# Patient Record
Sex: MALE | Race: WHITE | NOT HISPANIC OR LATINO | Employment: OTHER | URBAN - METROPOLITAN AREA
[De-identification: names, ages, dates, MRNs, and addresses within clinical notes are randomized per-mention and may not be internally consistent; named-entity substitution may affect disease eponyms.]

---

## 2017-01-20 ENCOUNTER — OFFICE VISIT (OUTPATIENT)
Dept: INTERNAL MEDICINE | Facility: CLINIC | Age: 82
End: 2017-01-20

## 2017-01-20 VITALS
HEART RATE: 76 BPM | BODY MASS INDEX: 31.95 KG/M2 | RESPIRATION RATE: 16 BRPM | TEMPERATURE: 97.2 F | WEIGHT: 204 LBS | DIASTOLIC BLOOD PRESSURE: 72 MMHG | SYSTOLIC BLOOD PRESSURE: 124 MMHG

## 2017-01-20 DIAGNOSIS — N40.0 BENIGN NON-NODULAR PROSTATIC HYPERPLASIA, PRESENCE OF LOWER URINARY TRACT SYMPTOMS UNSPECIFIED: ICD-10-CM

## 2017-01-20 DIAGNOSIS — M48.061 LUMBAR SPINAL STENOSIS: Primary | ICD-10-CM

## 2017-01-20 DIAGNOSIS — I10 ESSENTIAL HYPERTENSION: ICD-10-CM

## 2017-01-20 DIAGNOSIS — G20 PARKINSON DISEASE (HCC): ICD-10-CM

## 2017-01-20 DIAGNOSIS — N50.89 SCROTAL SWELLING: ICD-10-CM

## 2017-01-20 PROCEDURE — 99214 OFFICE O/P EST MOD 30 MIN: CPT | Performed by: FAMILY MEDICINE

## 2017-01-20 RX ORDER — HYDROCODONE BITARTRATE AND ACETAMINOPHEN 7.5; 325 MG/1; MG/1
1 TABLET ORAL EVERY 6 HOURS PRN
Qty: 60 TABLET | Refills: 0 | Status: SHIPPED | OUTPATIENT
Start: 2017-01-20 | End: 2017-04-20 | Stop reason: SDUPTHER

## 2017-01-20 NOTE — MR AVS SNAPSHOT
Sp Mlaagon   1/20/2017 2:30 PM   Office Visit    Dept Phone:  [phone]   Encounter #:  [id number]    Provider:  Hieu Blanco Jr., MD   Department:  [facility] INTERNAL MEDICINE                Your Full Care Plan              Your Updated Medication List          This list is accurate as of: 1/20/17  3:18 PM.  Always use your most recent med list.                amLODIPine 10 MG tablet   Commonly known as:  NORVASC       aspirin 81 MG EC tablet       atenolol 25 MG tablet   Commonly known as:  TENORMIN       carbidopa-levodopa ER  MG per tablet   Commonly known as:  SINEMET CR       cholecalciferol 1000 UNITS tablet   Commonly known as:  VITAMIN D3       diphenhydrAMINE 12.5 MG/5ML elixir   Commonly known as:  BENADRYL       donepezil 10 MG tablet   Commonly known as:  ARICEPT       ferrous sulfate 324 (65 FE) MG tablet delayed-release EC tablet       hydrochlorothiazide 25 MG tablet   Commonly known as:  HYDRODIURIL   Take 1 tablet by mouth Daily.       HYDROcodone-acetaminophen 7.5-325 MG per tablet   Commonly known as:  NORCO   Take 1 tablet by mouth Every 6 (Six) Hours As Needed for moderate pain (4-6).       lactulose 20 GM/30ML solution solution       LAMISIL 250 MG tablet   Generic drug:  terbinafine       LORazepam 0.5 MG tablet   Commonly known as:  ATIVAN       omeprazole 20 MG capsule   Commonly known as:  priLOSEC       oxybutynin 5 MG tablet   Commonly known as:  DITROPAN       QUEtiapine 25 MG tablet   Commonly known as:  SEROQUEL   Take 1 tablet by mouth Every Night.       rOPINIRole 3 MG tablet   Commonly known as:  REQUIP       sennosides-docusate sodium 8.6-50 MG tablet   Commonly known as:  SENOKOT-S   Take 2 tablets by mouth Every Night.       tamsulosin 0.4 MG capsule 24 hr capsule   Commonly known as:  FLOMAX               We Performed the Following     Ambulatory Referral to Urology       You Were Diagnosed With        Codes Comments    Lumbar spinal stenosis    -  Primary ICD-10-CM: M48.06  ICD-9-CM: 724.02     Parkinson disease     ICD-10-CM: G20  ICD-9-CM: 332.0     Essential hypertension     ICD-10-CM: I10  ICD-9-CM: 401.9     Benign non-nodular prostatic hyperplasia, presence of lower urinary tract symptoms unspecified     ICD-10-CM: N40.0  ICD-9-CM: 600.90     Scrotal swelling     ICD-10-CM: N50.89  ICD-9-CM: 608.86       Instructions     None    Patient Instructions History      Upcoming Appointments     Visit Type Date Time Department    OFFICE VISIT 1/20/2017  2:30 PM LÁZARO SINGH 1 3622      [employer] Signup     Our records indicate that you have declined [employer] signup. If you would like to sign up for [email or web address], please email [email or web address] or call [phone] to obtain an activation code.             Other Info from Your Visit           Allergies     No Known Allergies      Reason for Visit     Back Pain     Groin Swelling     Gait Problem           Vital Signs     Blood Pressure Pulse Temperature Respirations Weight Body Mass Index    124/72 (BP Location: Left arm, Patient Position: Sitting, Cuff Size: Adult) 76 97.2 °F (36.2 °C) (Tympanic) 16 204 lb (92.5 kg) 31.95 kg/m2    Smoking Status                   Never Smoker           Problems and Diagnoses Noted     Benign enlargement of prostate    High blood pressure    Lumbar spinal stenosis    Parkinson disease    Swelling of scrotum

## 2017-01-20 NOTE — PROGRESS NOTES
Subjective   Sp Malagon is a 81 y.o. male.     Chief Complaint   Patient presents with   • Back Pain   • Groin Swelling   • Gait Problem         History of Present Illness the patient returns with his wife is had successful bar surgery at [facility].  This is relieved much of his pain.  He saw some pain in the buttocks.  These can ago see his hip surgeon regarding possible left hip surgery.    He reports a problem with big testicles.  This is being on for several months.  He has some skin irritation from this.  Otherwise he's been in the [facility] and they given him fluorouracil use on some skin cancer changes in his legs and his cause redness.    We discussed refilling pain medicine.  Takes occasional pain pill.    The following portions of the patient's history were reviewed and updated as appropriate: allergies, current medications, past social history and problem list.    Review of Systems   Constitutional: Negative.    HENT: Negative.    Eyes: Negative.    Respiratory: Positive for shortness of breath.    Cardiovascular: Negative.    Gastrointestinal: Negative.    Endocrine: Negative.    Genitourinary: Positive for scrotal swelling.   Musculoskeletal: Positive for back pain.   Skin: Positive for rash.   Allergic/Immunologic: Negative.    Neurological: Negative.    Hematological: Negative.    Psychiatric/Behavioral: Negative.        Objective   Vitals:    01/20/17 1443   BP: 124/72   Pulse: 76   Resp: 16   Temp: 97.2 °F (36.2 °C)     Physical Exam   Constitutional: He is oriented to person, place, and time. He appears well-developed and well-nourished.   HENT:   Head: Normocephalic and atraumatic.   Right Ear: Tympanic membrane and external ear normal.   Left Ear: Tympanic membrane and external ear normal.   Nose: Nose normal.   Mouth/Throat: Oropharynx is clear and moist.   Eyes: Conjunctivae and EOM are normal. Pupils are equal, round, and reactive to light.   Neck: Normal range of motion. Neck supple. No  JVD present. No thyromegaly present.   Cardiovascular: Normal rate, regular rhythm, normal heart sounds and intact distal pulses.    Pulmonary/Chest: Effort normal and breath sounds normal.   Abdominal: Soft. Bowel sounds are normal.   Genitourinary:         Musculoskeletal: Normal range of motion.   Lymphadenopathy:     He has no cervical adenopathy.   Neurological: He is alert and oriented to person, place, and time. No cranial nerve deficit. He exhibits abnormal muscle tone. Coordination and gait abnormal.   Skin: Skin is warm and dry. No rash noted.   Psychiatric: He has a normal mood and affect. His behavior is normal. Judgment and thought content normal.   Vitals reviewed.      Assessment/Plan   Problem List Items Addressed This Visit        Cardiovascular and Mediastinum    Hypertension       Nervous and Auditory    Parkinson disease       Genitourinary    BPH (benign prostatic hyperplasia)       Other    Lumbar spinal stenosis - Primary      Other Visit Diagnoses     Scrotal swelling        Relevant Orders    Ambulatory Referral to Urology       plan: Refill hydrocodone No. 60.  Referral to urology concerning swelling of the right testicle and scrotum with probable hydrocele.    Recheck in about 3 months.  Follow-up with neurosurgery and orthopedic surgery concerning the left hip.  Meds for hypertension are continued.

## 2017-02-13 ENCOUNTER — HOSPITAL ENCOUNTER (EMERGENCY)
Facility: HOSPITAL | Age: 82
Discharge: HOME | End: 2017-02-13
Attending: EMERGENCY MEDICINE | Admitting: EMERGENCY MEDICINE

## 2017-02-13 ENCOUNTER — APPOINTMENT (OUTPATIENT)
Dept: GENERAL RADIOLOGY | Facility: HOSPITAL | Age: 82
End: 2017-02-13

## 2017-02-13 ENCOUNTER — APPOINTMENT (OUTPATIENT)
Dept: CT IMAGING | Facility: HOSPITAL | Age: 82
End: 2017-02-13

## 2017-02-13 VITALS
RESPIRATION RATE: 18 BRPM | TEMPERATURE: 98.7 F | OXYGEN SATURATION: 95 % | HEIGHT: 68 IN | HEART RATE: 67 BPM | BODY MASS INDEX: 30.31 KG/M2 | WEIGHT: 200 LBS | SYSTOLIC BLOOD PRESSURE: 175 MMHG | DIASTOLIC BLOOD PRESSURE: 95 MMHG

## 2017-02-13 DIAGNOSIS — R53.1 WEAKNESS: ICD-10-CM
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
## 2017-02-13 LAB
ALBUMIN SERPL-MCNC: 4.2 G/DL (ref 3.5–5.2)
ALBUMIN/GLOB SERPL: 1.4 G/DL
ALP SERPL-CCNC: 115 U/L (ref 39–117)
ALT SERPL W P-5'-P-CCNC: 14 U/L (ref 1–41)
ANION GAP SERPL CALCULATED.3IONS-SCNC: 12.9 MMOL/L
AST SERPL-CCNC: 11 U/L (ref 1–40)
BACTERIA UR QL AUTO: ABNORMAL /HPF
BASOPHILS # BLD AUTO: 0.01 10*3/MM3 (ref 0–0.2)
BASOPHILS NFR BLD AUTO: 0.1 % (ref 0–1.5)
BILIRUB SERPL-MCNC: 0.6 MG/DL (ref 0.1–1.2)
BILIRUB UR QL STRIP: NEGATIVE
BUN BLD-MCNC: 22 MG/DL (ref 8–23)
BUN/CREAT SERPL: 22 (ref 7–25)
CALCIUM SPEC-SCNC: 9 MG/DL (ref 8.6–10.5)
CHLORIDE SERPL-SCNC: 101 MMOL/L (ref 98–107)
CLARITY UR: CLEAR
CO2 SERPL-SCNC: 25.1 MMOL/L (ref 22–29)
COLOR UR: YELLOW
CREAT BLD-MCNC: 1 MG/DL (ref 0.76–1.27)
DEPRECATED RDW RBC AUTO: 44.4 FL (ref 37–54)
EOSINOPHIL # BLD AUTO: 0 10*3/MM3 (ref 0–0.7)
EOSINOPHIL NFR BLD AUTO: 0 % (ref 0.3–6.2)
ERYTHROCYTE [DISTWIDTH] IN BLOOD BY AUTOMATED COUNT: 15.1 % (ref 11.5–14.5)
GFR SERPL CREATININE-BSD FRML MDRD: 72 ML/MIN/1.73
GLOBULIN UR ELPH-MCNC: 3.1 GM/DL
GLUCOSE BLD-MCNC: 104 MG/DL (ref 65–99)
GLUCOSE UR STRIP-MCNC: NEGATIVE MG/DL
HCT VFR BLD AUTO: 40.9 % (ref 40.4–52.2)
HGB BLD-MCNC: 13.4 G/DL (ref 13.7–17.6)
HGB UR QL STRIP.AUTO: ABNORMAL
HOLD SPECIMEN: NORMAL
HOLD SPECIMEN: NORMAL
HYALINE CASTS UR QL AUTO: ABNORMAL /LPF
IMM GRANULOCYTES # BLD: 0.03 10*3/MM3 (ref 0–0.03)
IMM GRANULOCYTES NFR BLD: 0.3 % (ref 0–0.5)
KETONES UR QL STRIP: NEGATIVE
LEUKOCYTE ESTERASE UR QL STRIP.AUTO: NEGATIVE
LYMPHOCYTES # BLD AUTO: 0.71 10*3/MM3 (ref 0.9–4.8)
LYMPHOCYTES NFR BLD AUTO: 7.5 % (ref 19.6–45.3)
MAGNESIUM SERPL-MCNC: 1.9 MG/DL (ref 1.6–2.4)
MCH RBC QN AUTO: 26.3 PG (ref 27–32.7)
MCHC RBC AUTO-ENTMCNC: 32.8 G/DL (ref 32.6–36.4)
MCV RBC AUTO: 80.4 FL (ref 79.8–96.2)
MONOCYTES # BLD AUTO: 0.53 10*3/MM3 (ref 0.2–1.2)
MONOCYTES NFR BLD AUTO: 5.6 % (ref 5–12)
NEUTROPHILS # BLD AUTO: 8.22 10*3/MM3 (ref 1.9–8.1)
NEUTROPHILS NFR BLD AUTO: 86.5 % (ref 42.7–76)
NITRITE UR QL STRIP: NEGATIVE
PH UR STRIP.AUTO: 6 [PH] (ref 5–8)
PLATELET # BLD AUTO: 192 10*3/MM3 (ref 140–500)
PMV BLD AUTO: 9.3 FL (ref 6–12)
POTASSIUM BLD-SCNC: 3.9 MMOL/L (ref 3.5–5.2)
PROT SERPL-MCNC: 7.3 G/DL (ref 6–8.5)
PROT UR QL STRIP: NEGATIVE
RBC # BLD AUTO: 5.09 10*6/MM3 (ref 4.6–6)
RBC # UR: ABNORMAL /HPF
REF LAB TEST METHOD: ABNORMAL
SODIUM BLD-SCNC: 139 MMOL/L (ref 136–145)
SP GR UR STRIP: 1.01 (ref 1–1.03)
SQUAMOUS #/AREA URNS HPF: ABNORMAL /HPF
TROPONIN T SERPL-MCNC: <0.01 NG/ML (ref 0–0.03)
UROBILINOGEN UR QL STRIP: ABNORMAL
WBC NRBC COR # BLD: 9.5 10*3/MM3 (ref 4.5–10.7)
WBC UR QL AUTO: ABNORMAL /HPF
WHOLE BLOOD HOLD SPECIMEN: NORMAL
WHOLE BLOOD HOLD SPECIMEN: NORMAL

## 2017-02-13 PROCEDURE — 83735 ASSAY OF MAGNESIUM: CPT

## 2017-02-13 PROCEDURE — 93010 ELECTROCARDIOGRAM REPORT: CPT | Performed by: INTERNAL MEDICINE

## 2017-02-13 PROCEDURE — 99284 EMERGENCY DEPT VISIT MOD MDM: CPT

## 2017-02-13 PROCEDURE — 71010 HC CHEST PA OR AP: CPT

## 2017-02-13 PROCEDURE — 93005 ELECTROCARDIOGRAM TRACING: CPT

## 2017-02-13 PROCEDURE — 80053 COMPREHEN METABOLIC PANEL: CPT

## 2017-02-13 PROCEDURE — 85025 COMPLETE CBC W/AUTO DIFF WBC: CPT

## 2017-02-13 PROCEDURE — 84484 ASSAY OF TROPONIN QUANT: CPT

## 2017-02-13 PROCEDURE — 81001 URINALYSIS AUTO W/SCOPE: CPT

## 2017-02-13 PROCEDURE — 70450 CT HEAD/BRAIN W/O DYE: CPT

## 2017-02-13 RX ORDER — SODIUM CHLORIDE 0.9 % (FLUSH) 0.9 %
10 SYRINGE (ML) INJECTION AS NEEDED
Status: DISCONTINUED | OUTPATIENT
Start: 2017-02-13 | End: 2017-02-13 | Stop reason: HOSPADM

## 2017-02-13 NOTE — ED NOTES
Pt has hx of parkinsons and has had more difficulty walking lately. Today, pt was walking around and lost balance. Pt denies hitting head and loc. Pt has skin tear on left forearm.     Zara Duarte RN  02/13/17 0948

## 2017-02-13 NOTE — PROGRESS NOTES
"Discharge Planning Assessment  [facility]     Patient Name: Sp Malagon  MRN: [id number]  Today's Date: 2/13/2017    Admit Date: 2/13/2017          Discharge Needs Assessment       02/13/17 8532    Discharge Needs Assessment    Discharge Planning Comments --   Pt is medically clear for dc, per wife she is going to look into acute rehab. Has [facility] and in home personal care agency list for needs.      02/13/17 1047    Living Environment    Quality Of Family Relationships supportive   type of placement she needs at this time. [facility] provided as well as in home personal care agency list. A wait work up by PA for dispo.    Able to Return to Prior Living Arrangements --   Wife states that his mobility has decreased in the last 2 days and she can no longer \"pull and tug on him due to his size.\" Pt and wife state he has not had an inpt qualifying stay for  to pay for short term. Wife states she is unsure exactally what     Living Arrangement Comments --   Asked to talk with pt and wife Юлия about more assistance at home. Wife states that pt had back sx in October went to rehab for 2 1/2 weeks and then had [facility]. [facility] now but she states she needs more..            Discharge Plan     None        Discharge Placement     No information found                Demographic Summary     None            Functional Status     None            Psychosocial     None            Abuse/Neglect     None            Legal     None            Substance Abuse     None            Patient Forms     None          Sandy Nieto RN    "

## 2017-02-13 NOTE — PROGRESS NOTES
"Discharge Planning Assessment  [facility]     Patient Name: Sp Malagon  MRN: [id number]  Today's Date: 2/13/2017    Admit Date: 2/13/2017          Discharge Needs Assessment       02/13/17 1047    Living Environment    Quality Of Family Relationships supportive   type of placement she needs at this time. [facility] provided as well as in home personal care agency list. A wait work up by PA for dispo.    Able to Return to Prior Living Arrangements --   Wife states that his mobility has decreased in the last 2 days and she can no longer \"pull and tug on him due to his size.\" Pt and wife state he has not had an inpt qualifying stay for  to pay for short term. Wife states she is unsure exactally what     Living Arrangement Comments --   Asked to talk with pt and wife Юлия about more assistance at home. Wife states that pt had back sx in October went to rehab for 2 1/2 weeks and then had [facility]. [facility] now but she states she needs more..            Discharge Plan     None        Discharge Placement     No information found                Demographic Summary     None            Functional Status     None            Psychosocial     None            Abuse/Neglect     None            Legal     None            Substance Abuse     None            Patient Forms     None          Sandy Nieto RN    "

## 2017-02-13 NOTE — ED PROVIDER NOTES
I supervised care provided by the midlevel provider.    We have discussed this patient's history, physical exam, and treatment plan.   I have reviewed the note and personally saw and examined the patient and agree with the plan of care.      Pt with fall today after attempting to transfer to his wheelchair. Pt did not strike his head or have LOC. Pt has a hx of Parkinson's. Pt has had increasing difficulty with ambulation due to his Parkinson's.    On exam, he is A&Ox3 and in NAD. GCS 15. VS stable    Will discharge and [facility] his Parkinson's medications.    Documentation assistance provided by nita Jade for Dr. Barroso.  Information recorded by the scribe was done at my direction and has been verified and validated by me.       Gm Jade  02/13/17 1301       Rei Barroso MD  02/13/17 5636

## 2017-02-13 NOTE — DISCHARGE INSTRUCTIONS
Use walker at all times    Return for any fever or worsening symptoms      Call [facility] at [facility] for follow-up

## 2017-02-13 NOTE — ED PROVIDER NOTES
EMERGENCY DEPARTMENT ENCOUNTER    CHIEF COMPLAINT  Chief Complaint: Fall  History given by: Patient  History limited by: Nothing  Room Number: 04/04  PMD: Hieu Blanco Jr., MD      HPI:  Pt is a 81 y.o. male, h/o Parkinson's Disease, presents via EMS s/p mechanical fall attempting to stand from his chair this morning without LOC or head / neck trauma. The patient's spouse explains that she left the room right before the patient fell and she called EMS because she was unable to get the patient off the floor. The patient has experienced progressively worsening issues ambulating due to the Parkinson's Disease and he simply lost his balance due to the gait instability. He also sustained a superficial skin tear to his left wrist upon impact. Pt had a few episodes of diarrhea yesterday but denies nausea, vomiting, fever, chest pain or SOA. Patient was taking Aricept for dementia and confusion although was unable to tolerate the medication. No other complaints at this time.      Duration:  1-2 seconds  Onset: Sudden  Timing: Constant  Location: Left wrist  Radiation: None  Quality: Dull  Intensity/Severity: Moderate  Progression: No Change  Associated Symptoms: Skin tear, gait issue  Aggravating Factors: Palption  Alleviating Factors: None  Previous Episodes: Yes, previous falls  Treatment before arrival: None    PAST MEDICAL HISTORY  Active Ambulatory Problems     Diagnosis Date Noted   • Degeneration of lumbar intervertebral disc 05/20/2016   • Parkinson disease 05/20/2016   • Hypertension 05/20/2016   • Chronic constipation 05/20/2016   • Lumbar spinal stenosis 10/03/2016   • BPH (benign prostatic hyperplasia) 10/04/2016   • Postoperative delirium 10/04/2016     Resolved Ambulatory Problems     Diagnosis Date Noted   • No Resolved Ambulatory Problems     Past Medical History   Diagnosis Date   • Abnormal EKG    • Allergic rhinitis    • Arthritis    • GERD (gastroesophageal reflux disease)    • Numbness and tingling     • Parkinson's disease    • Sacral back pain        PAST SURGICAL HISTORY  Past Surgical History   Procedure Laterality Date   • Laminectomy  2004 2005   • Lumbar fusion  2009   • Back surgery     • Cervical spine anterior     • Eye surgery Left      cataract   • Cataract extraction Bilateral 2016   • Hip arthroplasty Right    • Lumbar discectomy fusion instrumentation N/A 10/3/2016     Procedure: L3-L5 HARDWARE REMOVAL. L5-S1 EXPIRATION OF FUSION;  Surgeon: Tristen Baez MD;  Location: [facility] OR;  Service:    • Lumbar laminectomy discectomy decompression N/A 10/3/2016     Procedure: L5-S1 LUMBAR LAMINECTOMY DISCECTOMY DECOMPRESSION POSTERIOR ;  Surgeon: Rajesh Kenney MD;  Location: [facility] OR;  Service:        FAMILY HISTORY  Family History   Problem Relation Age of Onset   • Emphysema Mother    • Cancer Father    • Heart disease Sister    • Diabetes Sister    • Hypertension Sister        SOCIAL HISTORY  Social History     Social History   • Marital status:      Spouse name: N/A   • Number of children: 1   • Years of education: 1 Year of College     Occupational History   • Retired      Social History Main Topics   • Smoking status: Never Smoker   • Smokeless tobacco: Never Used   • Alcohol use No   • Drug use: No   • Sexual activity: Defer     Other Topics Concern   • Not on file     Social History Narrative   • No narrative on file       ALLERGIES  Review of patient's allergies indicates no known allergies.    REVIEW OF SYSTEMS  Review of Systems   Constitutional: Negative for chills and fatigue.   HENT: Negative for congestion, rhinorrhea and sore throat.    Eyes: Negative for pain.   Respiratory: Negative for cough, shortness of breath and wheezing.    Cardiovascular: Negative for chest pain, palpitations and leg swelling.   Gastrointestinal: Negative for abdominal pain, diarrhea, nausea and vomiting.   Genitourinary: Negative for difficulty urinating, dysuria, flank pain and  frequency.   Musculoskeletal: Positive for gait problem. Negative for arthralgias, myalgias, neck pain and neck stiffness.   Skin: Negative for rash.        Skin tear left wrist    Neurological: Negative for dizziness, speech difficulty, weakness, light-headedness, numbness and headaches.   Psychiatric/Behavioral: Negative.    All other systems reviewed and are negative.      PHYSICAL EXAM  ED Triage Vitals   Temp Heart Rate Resp BP SpO2   02/13/17 0911 02/13/17 0911 02/13/17 0911 02/13/17 0911 02/13/17 0911   98.7 °F (37.1 °C) 76 16 159/90 95 %      Temp src Heart Rate Source Patient Position BP Location FiO2 (%)   -- -- -- -- --              Physical Exam   Constitutional: He is oriented to person, place, and time and well-developed, well-nourished, and in no distress.   HENT:   Head: Normocephalic and atraumatic.   Eyes: EOM are normal. Pupils are equal, round, and reactive to light.   Neck: Normal range of motion. Neck supple.   Cardiovascular: Normal rate, regular rhythm and normal heart sounds.    Pulmonary/Chest: Effort normal and breath sounds normal. No respiratory distress.   Abdominal: Soft. There is no tenderness. There is no rebound and no guarding.   Musculoskeletal: Normal range of motion. He exhibits no edema.   FROM left wrist    Neurological: He is alert and oriented to person, place, and time. He has normal sensation and normal strength.   Skin: Skin is warm and dry.   Skin tear left wrist.    Psychiatric: Mood and affect normal.   Nursing note and vitals reviewed.      LAB RESULTS  Lab Results (last 24 hours)     Procedure Component Value Units Date/Time    CBC & Differential [[id number]] Collected:  02/13/17 1005    Specimen:  Blood Updated:  02/13/17 1014    Narrative:       The following orders were created for panel order CBC & Differential.  Procedure                               Abnormality         Status                     ---------                               -----------         ------                      CBC Auto Differential[[id number]]         Abnormal            Final result                 Please view results for these tests on the individual orders.    Comprehensive Metabolic Panel [[id number]]  (Abnormal) Collected:  02/13/17 1005    Specimen:  Blood Updated:  02/13/17 1037     Glucose 104 (H) mg/dL      BUN 22 mg/dL      Creatinine 1.00 mg/dL      Sodium 139 mmol/L      Potassium 3.9 mmol/L      Chloride 101 mmol/L      CO2 25.1 mmol/L      Calcium 9.0 mg/dL      Total Protein 7.3 g/dL      Albumin 4.20 g/dL      ALT (SGPT) 14 U/L      AST (SGOT) 11 U/L      Alkaline Phosphatase 115 U/L      Total Bilirubin 0.6 mg/dL      eGFR Non African Amer 72 mL/min/1.73      Globulin 3.1 gm/dL      A/G Ratio 1.4 g/dL      BUN/Creatinine Ratio 22.0      Anion Gap 12.9 mmol/L     Narrative:       The MDRD GFR formula is only valid for adults with stable renal function between ages 18 and 70.    Troponin [[id number]]  (Normal) Collected:  02/13/17 1005    Specimen:  Blood Updated:  02/13/17 1037     Troponin T <0.010 ng/mL     Narrative:       Troponin T Reference Ranges:  Less than 0.03 ng/mL:    Negative for AMI  0.03 to 0.09 ng/mL:      Indeterminant for AMI  Greater than 0.09 ng/mL: Positive for AMI    Magnesium [[id number]]  (Normal) Collected:  02/13/17 1005    Specimen:  Blood Updated:  02/13/17 1037     Magnesium 1.9 mg/dL     CBC Auto Differential [[id number]]  (Abnormal) Collected:  02/13/17 1005    Specimen:  Blood Updated:  02/13/17 1014     WBC 9.50 10*3/mm3      RBC 5.09 10*6/mm3      Hemoglobin 13.4 (L) g/dL      Hematocrit 40.9 %      MCV 80.4 fL      MCH 26.3 (L) pg      MCHC 32.8 g/dL      RDW 15.1 (H) %      RDW-SD 44.4 fl      MPV 9.3 fL      Platelets 192 10*3/mm3      Neutrophil % 86.5 (H) %      Lymphocyte % 7.5 (L) %      Monocyte % 5.6 %      Eosinophil % 0.0 (L) %      Basophil % 0.1 %      Immature Grans % 0.3 %      Neutrophils, Absolute 8.22 (H) 10*3/mm3      Lymphocytes, Absolute 0.71  (L) 10*3/mm3      Monocytes, Absolute 0.53 10*3/mm3      Eosinophils, Absolute 0.00 10*3/mm3      Basophils, Absolute 0.01 10*3/mm3      Immature Grans, Absolute 0.03 10*3/mm3     Urinalysis With / Culture If Indicated [[id number]]  (Abnormal) Collected:  02/13/17 1129    Specimen:  Urine from Urine, Clean Catch Updated:  02/13/17 1247     Color, UA Yellow      Appearance, UA Clear      pH, UA 6.0      Specific Gravity, UA 1.011      Glucose, UA Negative      Ketones, UA Negative      Bilirubin, UA Negative      Blood, UA Small (1+) (A)      Protein, UA Negative      Leuk Esterase, UA Negative      Nitrite, UA Negative      Urobilinogen, UA 0.2 E.U./dL     Urinalysis, Microscopic Only [[id number]]  (Abnormal) Collected:  02/13/17 1129    Specimen:  Urine from Urine, Clean Catch Updated:  02/13/17 1247     RBC, UA 0-2 (A) /HPF      WBC, UA 0-2 (A) /HPF      Bacteria, UA Trace (A) /HPF      Squamous Epithelial Cells, UA 0-2 /HPF      Hyaline Casts, UA None Seen /LPF      Methodology Automated Microscopy           I ordered the above labs and reviewed the results    RADIOLOGY  CT Head Without Contrast   Final Result   Unremarkable CT scan of the head without contrast.       This report was finalized on 2/13/2017 12:20 PM by Dr. Gregory Brooke MD.          XR Chest 1 View   Final Result         10:35  Reviewed CXR - Nothing acute. Independently viewed by me. Interpreted by radiologist.     I ordered the above noted radiological studies. Interpreted by radiologist.  Reviewed by me in PACS.       PROCEDURES  Procedures      PROGRESS AND CONSULTS  ED Course     09:58  Ordered EKG, labs and CXR for further evaluation.     11:36  Ordered CT head for further evaluation     12:40  Discussed case with  and he agrees with the treatment plan.     12:50  Rechecked patient and they are resting comfortably. Discussed the patient's pertinent labs and imaging results, including negative labs, CXR and CT head. Discussed plan to  discharge the patient home and recommended follow up with his Neurologist. Patient agrees with the plan and all questions were addressed.     Latest vital signs   BP- 138/91 HR- 71 Temp- 98.7 °F (37.1 °C) O2 sat- 95%      MEDICAL DECISION MAKING  Results were reviewed/discussed with the patient and they were also made aware of online access. Pt also made aware that some labs, such as cultures, will not be resulted during ER visit and follow up with PMD is necessary.     MDM  Number of Diagnoses or Management Options  Fall, initial encounter:   Weakness:   Diagnosis management comments: Pt has no focal neuro deficits.  No significant injury from fall.  He has worsening Parkinson's and dementia.  I had the [facility] nurse speak to pt and family, they are not interested in placement at this time.         Amount and/or Complexity of Data Reviewed  Clinical lab tests: ordered and reviewed  Tests in the radiology section of CPT®: ordered and reviewed  Tests in the medicine section of CPT®: ordered and reviewed    Patient Progress  Patient progress: stable         DIAGNOSIS  Final diagnoses:   Fall, initial encounter   Weakness       DISPOSITION  DISCHARGE    Patient discharged in stable condition.    Reviewed implications of results, diagnosis, meds, responsibility to follow up, warning signs and symptoms of possible worsening, potential complications and reasons to return to ER, including any further falls.     Patient/Family voiced understanding of above instructions.    Discussed plan for discharge, as there is no emergent indication for admission.  Pt/family is agreeable and understands need for follow up and repeat testing.  Pt is aware that discharge does not mean that nothing is wrong but it indicates no emergency is present that requires admission and they must continue care with follow-up as given below or physician of their choice.     FOLLOW-UP  Hieu Blanco Jr., MD  [facility] [address]  [address]  [phone]    Schedule an appointment as soon as possible for a visit           Medication List      Stop          cholecalciferol 1000 UNITS tablet   Commonly known as:  VITAMIN D3       donepezil 10 MG tablet   Commonly known as:  ARICEPT       hydrochlorothiazide 25 MG tablet   Commonly known as:  HYDRODIURIL       lactulose 20 GM/30ML solution solution       LAMISIL 250 MG tablet   Generic drug:  terbinafine       QUEtiapine 25 MG tablet   Commonly known as:  SEROQUEL       sennosides-docusate sodium 8.6-50 MG tablet   Commonly known as:  SENOKOT-S           Latest Documented Vital Signs:  As of 12:52 PM  BP- 138/91 HR- 71 Temp- 98.7 °F (37.1 °C) O2 sat- 95%    --  Documentation assistance provided by nita Castillo for Shaggy Pineda PA-C.  Information recorded by the scribe was done at my direction and has been verified and validated by me.          Cresencio Castillo  02/13/17 1308       JÚNIOR Pearson  02/13/17 9800

## 2017-02-15 ENCOUNTER — TELEPHONE (OUTPATIENT)
Dept: SOCIAL WORK | Facility: HOSPITAL | Age: 82
End: 2017-02-15

## 2017-02-15 NOTE — TELEPHONE ENCOUNTER
ED follow-up phone call. Spoke w/ wife, who states that patient is doing better, and able to ambulate w/ walker today. Has upcoming f/u lu't w/ both PCP and neurologist

## 2017-04-20 ENCOUNTER — OFFICE VISIT (OUTPATIENT)
Dept: INTERNAL MEDICINE | Facility: CLINIC | Age: 82
End: 2017-04-20

## 2017-04-20 VITALS
WEIGHT: 202 LBS | DIASTOLIC BLOOD PRESSURE: 73 MMHG | TEMPERATURE: 98.1 F | OXYGEN SATURATION: 94 % | SYSTOLIC BLOOD PRESSURE: 130 MMHG | HEART RATE: 48 BPM | BODY MASS INDEX: 30.62 KG/M2 | HEIGHT: 68 IN

## 2017-04-20 DIAGNOSIS — G20 PARKINSON DISEASE (HCC): ICD-10-CM

## 2017-04-20 DIAGNOSIS — M48.061 LUMBAR SPINAL STENOSIS: Primary | ICD-10-CM

## 2017-04-20 DIAGNOSIS — N40.0 BENIGN NON-NODULAR PROSTATIC HYPERPLASIA, PRESENCE OF LOWER URINARY TRACT SYMPTOMS UNSPECIFIED: ICD-10-CM

## 2017-04-20 DIAGNOSIS — M54.40 CHRONIC LOW BACK PAIN WITH SCIATICA, SCIATICA LATERALITY UNSPECIFIED, UNSPECIFIED BACK PAIN LATERALITY: ICD-10-CM

## 2017-04-20 DIAGNOSIS — I10 ESSENTIAL HYPERTENSION: ICD-10-CM

## 2017-04-20 DIAGNOSIS — G89.29 CHRONIC LOW BACK PAIN WITH SCIATICA, SCIATICA LATERALITY UNSPECIFIED, UNSPECIFIED BACK PAIN LATERALITY: ICD-10-CM

## 2017-04-20 PROCEDURE — 99214 OFFICE O/P EST MOD 30 MIN: CPT | Performed by: FAMILY MEDICINE

## 2017-04-20 RX ORDER — HYDROCODONE BITARTRATE AND ACETAMINOPHEN 7.5; 325 MG/1; MG/1
1 TABLET ORAL EVERY 6 HOURS PRN
Qty: 60 TABLET | Refills: 0 | Status: SHIPPED | OUTPATIENT
Start: 2017-04-20 | End: 2017-05-23

## 2017-04-20 NOTE — PROGRESS NOTES
Subjective   Sp Malagon is a 81 y.o. male.     Chief Complaint   Patient presents with   • Back Pain   • Tremors         History of Present Illness   Patient is here coming by his wife.  His whole situation is becoming a functional problem.  He has had back surgery but has had repetitive falling episodes based on his parkinsonism.  [facility]  [facility].  He is seeing urologist at [facility].  His mobility is limited.  He has some confusion at times.  Is problem at night as his wife asked him up to the bathroom to urinate for 5 time sometimes sometimes only twice.  He is on oxybutynin and Flomax.  He is given samples of myrbetriq better 25 mg to try although I do know if it [facility] and have this medication.    We discussed treatment of acne takes an occasional hydrocodone 7.5.    The following portions of the patient's history were reviewed and updated as appropriate: allergies, current medications, past social history and problem list.    Review of Systems   Constitutional: Positive for fatigue.   HENT: Negative.    Eyes: Negative.    Respiratory: Negative.    Cardiovascular: Negative.    Gastrointestinal: Negative.    Endocrine: Negative.    Genitourinary: Positive for frequency.   Musculoskeletal: Positive for back pain and gait problem.   Skin: Negative.    Allergic/Immunologic: Negative.    Neurological: Positive for tremors and weakness.   Hematological: Negative.    Psychiatric/Behavioral: Negative.        Objective   Vitals:    04/20/17 1208   BP: 130/73   Pulse: (!) 48   Temp: 98.1 °F (36.7 °C)   SpO2: 94%     Physical Exam   Constitutional: He is oriented to person, place, and time. He appears well-developed and well-nourished.   HENT:   Head: Normocephalic and atraumatic.   Right Ear: Tympanic membrane and external ear normal.   Left Ear: Tympanic membrane and external ear normal.    Nose: Nose normal.   Mouth/Throat: Oropharynx is clear and moist.   Eyes: Conjunctivae and EOM are normal. Pupils are equal, round, and reactive to light.   Neck: Normal range of motion. Neck supple. No JVD present. No thyromegaly present.   Cardiovascular: Normal rate, regular rhythm, normal heart sounds and intact distal pulses.    Pulmonary/Chest: Effort normal and breath sounds normal.   Abdominal: Soft. Bowel sounds are normal.   Musculoskeletal:        Lumbar back: He exhibits decreased range of motion and tenderness.   Lymphadenopathy:     He has no cervical adenopathy.   Neurological: He is alert and oriented to person, place, and time. No cranial nerve deficit. He exhibits abnormal muscle tone. Coordination and gait abnormal.   Skin: Skin is warm and dry. No rash noted.        Psychiatric: Judgment and thought content normal. His affect is labile. He is slowed. Cognition and memory are impaired.   Vitals reviewed.      Assessment/Plan   Problem List Items Addressed This Visit        Cardiovascular and Mediastinum    Hypertension       Nervous and Auditory    Parkinson disease       Genitourinary    BPH (benign prostatic hyperplasia)       Other    Lumbar spinal stenosis - Primary      Other Visit Diagnoses     Chronic low back pain with sciatica, sciatica laterality unspecified, unspecified back pain laterality          Plan: This is a difficult situation.  [facility] and [facility]ist at [facility] in July.  We'll see him back in October sooner if needed.  He's given hydrocodone 7.5 4 times a day when necessary.  His wife may need respite care at some point.

## 2017-04-21 ENCOUNTER — TELEPHONE (OUTPATIENT)
Dept: INTERNAL MEDICINE | Facility: CLINIC | Age: 82
End: 2017-04-21

## 2017-04-21 NOTE — TELEPHONE ENCOUNTER
The pt's wife called to let you know he is going to see Dr Soni Calderon down at [facility] 7/13/17    She would like to speak with you, please call her

## 2017-04-21 NOTE — TELEPHONE ENCOUNTER
Patient is having some delusions at home I discussed whether or not patient was on Seroquel generic.  I believe he supposed to be but she does not think he is on it.

## 2017-05-24 RX ORDER — HYDROCODONE BITARTRATE AND ACETAMINOPHEN 5; 325 MG/1; MG/1
1 TABLET ORAL DAILY PRN
Qty: 60 TABLET | Refills: 0 | Status: SHIPPED | OUTPATIENT
Start: 2017-05-24 | End: 2017-10-16 | Stop reason: ALTCHOICE

## 2017-05-25 ENCOUNTER — OUTSIDE FACILITY SERVICE (OUTPATIENT)
Dept: FAMILY MEDICINE CLINIC | Facility: CLINIC | Age: 82
End: 2017-05-25

## 2017-05-25 PROCEDURE — 99305 1ST NF CARE MODERATE MDM 35: CPT | Performed by: FAMILY MEDICINE

## 2017-09-22 ENCOUNTER — TELEPHONE (OUTPATIENT)
Dept: INTERNAL MEDICINE | Facility: CLINIC | Age: 82
End: 2017-09-22

## 2017-09-22 NOTE — TELEPHONE ENCOUNTER
I called patient to let him know that he needed to be seen in the ER. Patient's wife refused to let me speak with the patient and stated that she isn't taking him to the ER. I told her that the symptoms that the patient provided were very serious and were beyond what we can do in the office. Patient's wife stated she isn't stupid and she will know when to take her  to the ER. But she is not taking him right now. I asked if patient breathing was getting better and patient's wife said a little from yesterday. My manager Sharon called that patient after me.

## 2017-09-24 ENCOUNTER — DOCUMENTATION (OUTPATIENT)
Dept: INTERNAL MEDICINE | Facility: CLINIC | Age: 82
End: 2017-09-24

## 2017-09-25 DIAGNOSIS — R00.2 PALPITATIONS: Primary | ICD-10-CM

## 2017-09-25 DIAGNOSIS — R07.9 CHEST PAIN, UNSPECIFIED TYPE: ICD-10-CM

## 2017-10-16 ENCOUNTER — HOSPITAL ENCOUNTER (OUTPATIENT)
Dept: GENERAL RADIOLOGY | Facility: HOSPITAL | Age: 82
Discharge: HOME | End: 2017-10-16
Attending: INTERNAL MEDICINE | Admitting: INTERNAL MEDICINE

## 2017-10-16 ENCOUNTER — OFFICE VISIT (OUTPATIENT)
Dept: CARDIOLOGY | Facility: CLINIC | Age: 82
End: 2017-10-16

## 2017-10-16 VITALS
BODY MASS INDEX: 30.62 KG/M2 | WEIGHT: 202 LBS | HEART RATE: 69 BPM | SYSTOLIC BLOOD PRESSURE: 142 MMHG | HEIGHT: 68 IN | DIASTOLIC BLOOD PRESSURE: 90 MMHG

## 2017-10-16 DIAGNOSIS — R06.09 DOE (DYSPNEA ON EXERTION): Primary | ICD-10-CM

## 2017-10-16 DIAGNOSIS — I10 ESSENTIAL HYPERTENSION: ICD-10-CM

## 2017-10-16 DIAGNOSIS — R06.09 DOE (DYSPNEA ON EXERTION): ICD-10-CM

## 2017-10-16 PROCEDURE — 71020 HC CHEST PA AND LATERAL: CPT

## 2017-10-16 PROCEDURE — 99204 OFFICE O/P NEW MOD 45 MIN: CPT | Performed by: INTERNAL MEDICINE

## 2017-10-16 PROCEDURE — 93000 ELECTROCARDIOGRAM COMPLETE: CPT | Performed by: INTERNAL MEDICINE

## 2017-10-16 NOTE — PROGRESS NOTES
Sp Malagon  1935  Date of Office Visit: 10/16/2017  Encounter Provider: Donald Alvarado MD  Place of Service: [facility]      CHIEF COMPLAINT:  Essential hypertension  Shortness of air  Lower extremity edema  Parkinson's      HISTORY OF PRESENT ILLNESS:Dr. Blanco,     I had the pleasure of seeing your patient in consultation today.  As you well know, he is a very pleasant 81-year-old gentleman with a medical history of essential hypertension, Parkinson's disease, and chronic lower extremity edema who presents to me secondary to palpitations and shortness of breath with exertion.  He states that over the past, at least one month in duration, he has noticed increased dyspnea on exertion.  He reports that he works with physical therapy and notices that typically 1-2 minutes into his rehab session, that he has significant shortness of air requiring him to quit performing that activity.  It improves with rest.  He also complains, during the same period of time, of a mild increase in his baseline lower extremity edema but also orthopnea.  He states that when he lies flat at night, that at least 5 days of the week, he becomes short of air and has to sit up on the side of his bed.  He has some improvement in his dyspnea with that.  He denies any chest pain.      He also states that he had one episode of palpitations and an irregular heartbeat which lasted 1-2 minutes while he was working with rehab.  He has not had a recurrence of that in the past couple of weeks.           Review of Systems   Constitution: Negative for fever, weakness and malaise/fatigue.   HENT: Negative for nosebleeds and sore throat.    Eyes: Negative for blurred vision and double vision.   Cardiovascular: Positive for leg swelling. Negative for chest pain, claudication, palpitations and syncope.   Respiratory: Positive for cough, shortness of breath and wheezing. Negative for snoring.    Endocrine:  Negative for cold intolerance, heat intolerance and polydipsia.   Skin: Negative for itching, poor wound healing and rash.   Musculoskeletal: Negative for joint pain, joint swelling, muscle weakness and myalgias.   Gastrointestinal: Negative for abdominal pain, melena, nausea and vomiting.   Genitourinary: Positive for frequency.   Neurological: Negative for light-headedness, loss of balance, seizures and vertigo.   Psychiatric/Behavioral: Negative for altered mental status and depression.       Past Medical History:   Diagnosis Date   • Abnormal EKG     PT UNSURE    • Allergic rhinitis    • Arthritis    • Back pain    • BPH (benign prostatic hyperplasia)    • GERD (gastroesophageal reflux disease)    • Hypertension    • Left anterior fascicular block    • Lumbar spinal stenosis    • Numbness and tingling     LEFT LEG AND LEFT FOOT   • Parkinson disease     [facility]   • Sacral back pain    • Tremor        The following portions of the patient's history were reviewed and updated as appropriate: Social history , Family history and Surgical history     Current Outpatient Prescriptions on File Prior to Visit   Medication Sig Dispense Refill   • amLODIPine (NORVASC) 10 MG tablet Take 10 mg by mouth Every Morning.     • aspirin 81 MG EC tablet Take 81 mg by mouth daily. HOLD PRIOR TO SURG     • carbidopa-levodopa ER (SINEMET CR)  MG per tablet Take 1 tablet by mouth 4 (Four) Times a Day.     • omeprazole (PriLOSEC) 20 MG capsule Take 20 mg by mouth 3 (Three) Times a Day.     • [DISCONTINUED] atenolol (TENORMIN) 25 MG tablet Take 25 mg by mouth every morning.     • [DISCONTINUED] carbidopa-levodopa (SINEMET)  MG per tablet Take 1 tablet by mouth Every Night.     • [DISCONTINUED] cholecalciferol (VITAMIN D3) 1000 UNITS tablet Take 1,000 Units by mouth daily. HOLD PRIOR TO SURG     • [DISCONTINUED] diphenhydrAMINE (BENADRYL) 12.5 MG/5ML elixir Take  by mouth 4 (Four) Times a Day As Needed for  "itching.     • [DISCONTINUED] donepezil (ARICEPT) 10 MG tablet Take 5 mg by mouth every night.     • [DISCONTINUED] ferrous sulfate 324 (65 FE) MG tablet delayed-release EC tablet Take 324 mg by mouth Daily With Breakfast.     • [DISCONTINUED] hydrochlorothiazide (HYDRODIURIL) 25 MG tablet Take 1 tablet by mouth Daily. 30 tablet 2   • [DISCONTINUED] HYDROcodone-acetaminophen (NORCO) 5-325 MG per tablet Take 1 tablet by mouth Daily As Needed (pain). [place] 60 tablet 0   • [DISCONTINUED] lactulose 20 GM/30ML solution solution Take 20 g by mouth 2 (two) times a day as needed.     • [DISCONTINUED] LORazepam (ATIVAN) 0.5 MG tablet Take 0.5 mg by mouth Daily.     • [DISCONTINUED] oxybutynin (DITROPAN) 5 MG tablet Take 5 mg by mouth every night.     • [DISCONTINUED] QUEtiapine (SEROQUEL) 25 MG tablet Take 1 tablet by mouth Every Night. 30 tablet 2   • [DISCONTINUED] rOPINIRole (REQUIP) 3 MG tablet Take 3 mg by mouth 3 (three) times a day.     • [DISCONTINUED] sennosides-docusate sodium (SENOKOT-S) 8.6-50 MG tablet Take 2 tablets by mouth Every Night.     • [DISCONTINUED] tamsulosin (FLOMAX) 0.4 MG capsule 24 hr capsule Take 0.4 mg by mouth Every Night.     • [DISCONTINUED] terbinafine (LAMISIL) 250 MG tablet Take 250 mg by mouth daily. PT HAS NOT STARTED       No current facility-administered medications on file prior to visit.        No Known Allergies    Vitals:    10/16/17 1410   BP: 142/90   Pulse: 69   Weight: 202 lb (91.6 kg)   Height: 68\" (172.7 cm)     Physical Exam   Constitutional: He is oriented to person, place, and time. He appears well-developed and well-nourished.   HENT:   Head: Normocephalic and atraumatic.   Eyes: Conjunctivae and EOM are normal. No scleral icterus.   Neck: Normal range of motion. Neck supple. Normal carotid pulses, no hepatojugular reflux and no JVD present. Carotid bruit is not present. No tracheal deviation present. No thyromegaly present.   Cardiovascular: Normal rate and regular " rhythm.  Exam reveals no gallop and no friction rub.    No murmur heard.  Pulses:       Carotid pulses are 2+ on the right side, and 2+ on the left side.       Radial pulses are 2+ on the right side, and 2+ on the left side.        Femoral pulses are 2+ on the right side, and 2+ on the left side.       Dorsalis pedis pulses are 2+ on the right side, and 2+ on the left side.        Posterior tibial pulses are 2+ on the right side, and 2+ on the left side.   Pulmonary/Chest: Breath sounds normal. No respiratory distress. He has no decreased breath sounds. He has no wheezes. He has no rhonchi. He has no rales. He exhibits no tenderness.   Abdominal: Soft. Bowel sounds are normal. He exhibits no distension. There is no tenderness. There is no rebound.   Musculoskeletal: He exhibits no edema or deformity.   Neurological: He is alert and oriented to person, place, and time. He has normal strength. No sensory deficit.   Skin: No rash noted. No erythema.   Psychiatric: He has a normal mood and affect. His behavior is normal.           No components found for: CBC  No results found for: CMP  No components found for: LIPID  No results found for: BMP      ECG 12 Lead  Date/Time: 10/16/2017 2:14 PM  Performed by: REJI MONTEIRO  Authorized by: REJI MONTEIRO   Comparison: compared with previous ECG from 9/27/2016  Similar to previous ECG  Rhythm: sinus rhythm  Rate: normal  QRS axis: left  Other findings: PRWP  Clinical impression: non-specific ECG  Comments: Nonspecific T-wave abnormalities diffuse leads            2/13/17  CXR  CONCLUSION: No acute cardiovascular or pulmonary process has developed.    DISCUSSION/SUMMARY  He is a very pleasant 81-year-old gentleman with a medical history of Parkinson's disease, essential hypertension, gastroesophageal reflux disease and chronic lower extremity edema who presents to me for evaluation of dyspnea on exertion along with palpitations.  He is currently in a sinus  rhythm and has no evidence of ectopy on examination.  He does not have symptoms that sound to me to be consistent with atrial fibrillation.  I do not think that rhythm monitoring is indicated in his case.       His lower extremity edema along with his dyspnea on exertion and orthopnea are a little bit concerning.  Most of his lower extremity edema appears to be more venous insufficiency; however, in the setting of his symptoms that sound to be orthopnea, I do think a heart failure evaluation is warranted.       1. Dyspnea on exertion.  I am going to start out with a chest x-ray and transthoracic echocardiogram.  He states that [facility] and I would like to get a copy of that before ordering labs.  It would be nice to have a D-dimer, a proBNP along with a CMP.  Lower extremity compression stockings have been recommended along with elevation.  I think that if he has no evidence of valvular heart disease or heart failure and has a normal proBNP, I would consider a pulmonary evaluation.    2. Lower extremity edema; more likely to be venous insufficiency.  I am going to order a proBNP.  Lower extremity compression stockings recommended.  No Lasix at this time.

## 2017-10-17 NOTE — PROGRESS NOTES
Please give him a call with him know his chest x-ray is normal.  We will wait on his echo as well.  We are also expecting lab work from his [facility] today [facility] the [facility]

## 2017-10-23 DIAGNOSIS — R06.09 DOE (DYSPNEA ON EXERTION): Primary | ICD-10-CM

## 2017-10-24 ENCOUNTER — TELEPHONE (OUTPATIENT)
Dept: CARDIOLOGY | Facility: CLINIC | Age: 82
End: 2017-10-24

## 2017-10-24 NOTE — TELEPHONE ENCOUNTER
----- Message from Donald Alvarado MD sent at 10/23/2017 12:38 PM EDT -----  Patient needs a proBNP and d-dimer.  I placed the orders.  Please let him

## 2017-10-25 ENCOUNTER — HOSPITAL ENCOUNTER (OUTPATIENT)
Dept: CARDIOLOGY | Facility: HOSPITAL | Age: 82
Discharge: HOME | End: 2017-10-25
Attending: INTERNAL MEDICINE | Admitting: INTERNAL MEDICINE

## 2017-10-25 ENCOUNTER — LAB (OUTPATIENT)
Dept: LAB | Facility: HOSPITAL | Age: 82
End: 2017-10-25

## 2017-10-25 VITALS
WEIGHT: 202 LBS | HEART RATE: 72 BPM | DIASTOLIC BLOOD PRESSURE: 82 MMHG | BODY MASS INDEX: 30.62 KG/M2 | HEIGHT: 68 IN | SYSTOLIC BLOOD PRESSURE: 120 MMHG

## 2017-10-25 DIAGNOSIS — R06.09 DOE (DYSPNEA ON EXERTION): ICD-10-CM

## 2017-10-25 DIAGNOSIS — I10 ESSENTIAL HYPERTENSION: ICD-10-CM

## 2017-10-25 LAB
ASCENDING AORTA: 3.7 CM
BH CV ECHO MEAS - ACS: 1.9 CM
BH CV ECHO MEAS - AO MAX PG (FULL): 2.8 MMHG
BH CV ECHO MEAS - AO MAX PG: 7.2 MMHG
BH CV ECHO MEAS - AO MEAN PG (FULL): 1.8 MMHG
BH CV ECHO MEAS - AO MEAN PG: 4.1 MMHG
BH CV ECHO MEAS - AO ROOT AREA (BSA CORRECTED): 1.8
BH CV ECHO MEAS - AO ROOT AREA: 10.9 CM^2
BH CV ECHO MEAS - AO ROOT DIAM: 3.7 CM
BH CV ECHO MEAS - AO V2 MAX: 133.8 CM/SEC
BH CV ECHO MEAS - AO V2 MEAN: 94.9 CM/SEC
BH CV ECHO MEAS - AO V2 VTI: 30.1 CM
BH CV ECHO MEAS - AVA(I,A): 2.8 CM^2
BH CV ECHO MEAS - AVA(I,D): 2.8 CM^2
BH CV ECHO MEAS - AVA(V,A): 2.8 CM^2
BH CV ECHO MEAS - AVA(V,D): 2.8 CM^2
BH CV ECHO MEAS - BSA(HAYCOCK): 2.1 M^2
BH CV ECHO MEAS - BSA: 2.1 M^2
BH CV ECHO MEAS - BZI_BMI: 30.7 KILOGRAMS/M^2
BH CV ECHO MEAS - BZI_METRIC_HEIGHT: 172.7 CM
BH CV ECHO MEAS - BZI_METRIC_WEIGHT: 91.6 KG
BH CV ECHO MEAS - CONTRAST EF (2CH): 55.2 ML/M^2
BH CV ECHO MEAS - CONTRAST EF 4CH: 53.9 ML/M^2
BH CV ECHO MEAS - EDV(MOD-SP2): 96 ML
BH CV ECHO MEAS - EDV(MOD-SP4): 102 ML
BH CV ECHO MEAS - EDV(TEICH): 81.2 ML
BH CV ECHO MEAS - EF(CUBED): 73.7 %
BH CV ECHO MEAS - EF(MOD-SP2): 55.2 %
BH CV ECHO MEAS - EF(MOD-SP4): 53.9 %
BH CV ECHO MEAS - EF(TEICH): 65.9 %
BH CV ECHO MEAS - ESV(MOD-SP2): 43 ML
BH CV ECHO MEAS - ESV(MOD-SP4): 47 ML
BH CV ECHO MEAS - ESV(TEICH): 27.7 ML
BH CV ECHO MEAS - FS: 35.9 %
BH CV ECHO MEAS - IVS/LVPW: 1
BH CV ECHO MEAS - IVSD: 1.1 CM
BH CV ECHO MEAS - LAT PEAK E' VEL: 8 CM/SEC
BH CV ECHO MEAS - LV DIASTOLIC VOL/BSA (35-75): 49.7 ML/M^2
BH CV ECHO MEAS - LV MASS(C)D: 163.6 GRAMS
BH CV ECHO MEAS - LV MASS(C)DI: 79.7 GRAMS/M^2
BH CV ECHO MEAS - LV MAX PG: 4.4 MMHG
BH CV ECHO MEAS - LV MEAN PG: 2.4 MMHG
BH CV ECHO MEAS - LV SYSTOLIC VOL/BSA (12-30): 22.9 ML/M^2
BH CV ECHO MEAS - LV V1 MAX: 104.3 CM/SEC
BH CV ECHO MEAS - LV V1 MEAN: 71.6 CM/SEC
BH CV ECHO MEAS - LV V1 VTI: 23.2 CM
BH CV ECHO MEAS - LVIDD: 4.3 CM
BH CV ECHO MEAS - LVIDS: 2.7 CM
BH CV ECHO MEAS - LVLD AP2: 7.3 CM
BH CV ECHO MEAS - LVLD AP4: 7.8 CM
BH CV ECHO MEAS - LVLS AP2: 6 CM
BH CV ECHO MEAS - LVLS AP4: 7 CM
BH CV ECHO MEAS - LVOT AREA (M): 3.5 CM^2
BH CV ECHO MEAS - LVOT AREA: 3.6 CM^2
BH CV ECHO MEAS - LVOT DIAM: 2.1 CM
BH CV ECHO MEAS - LVPWD: 1.1 CM
BH CV ECHO MEAS - MED PEAK E' VEL: 6 CM/SEC
BH CV ECHO MEAS - MV A DUR: 0.12 SEC
BH CV ECHO MEAS - MV A MAX VEL: 93.6 CM/SEC
BH CV ECHO MEAS - MV DEC SLOPE: 144.4 CM/SEC^2
BH CV ECHO MEAS - MV DEC TIME: 0.37 SEC
BH CV ECHO MEAS - MV E MAX VEL: 50.9 CM/SEC
BH CV ECHO MEAS - MV E/A: 0.54
BH CV ECHO MEAS - MV MAX PG: 4.3 MMHG
BH CV ECHO MEAS - MV MEAN PG: 1.5 MMHG
BH CV ECHO MEAS - MV P1/2T MAX VEL: 53.8 CM/SEC
BH CV ECHO MEAS - MV P1/2T: 109.2 MSEC
BH CV ECHO MEAS - MV V2 MAX: 103.7 CM/SEC
BH CV ECHO MEAS - MV V2 MEAN: 55.5 CM/SEC
BH CV ECHO MEAS - MV V2 VTI: 28.9 CM
BH CV ECHO MEAS - MVA P1/2T LCG: 4.1 CM^2
BH CV ECHO MEAS - MVA(P1/2T): 2 CM^2
BH CV ECHO MEAS - MVA(VTI): 2.9 CM^2
BH CV ECHO MEAS - PA ACC TIME: 0.08 SEC
BH CV ECHO MEAS - PA MAX PG (FULL): 5.9 MMHG
BH CV ECHO MEAS - PA MAX PG: 7.8 MMHG
BH CV ECHO MEAS - PA PR(ACCEL): 41 MMHG
BH CV ECHO MEAS - PA V2 MAX: 139.9 CM/SEC
BH CV ECHO MEAS - PULM A REVS DUR: 0.14 SEC
BH CV ECHO MEAS - PULM A REVS VEL: 36.1 CM/SEC
BH CV ECHO MEAS - PULM DIAS VEL: 34.7 CM/SEC
BH CV ECHO MEAS - PULM S/D: 1.6
BH CV ECHO MEAS - PULM SYS VEL: 54.6 CM/SEC
BH CV ECHO MEAS - PVA(V,A): 1.8 CM^2
BH CV ECHO MEAS - PVA(V,D): 1.8 CM^2
BH CV ECHO MEAS - QP/QS: 0.73
BH CV ECHO MEAS - RV MAX PG: 2 MMHG
BH CV ECHO MEAS - RV MEAN PG: 1.1 MMHG
BH CV ECHO MEAS - RV V1 MAX: 69.8 CM/SEC
BH CV ECHO MEAS - RV V1 MEAN: 50.8 CM/SEC
BH CV ECHO MEAS - RV V1 VTI: 16.6 CM
BH CV ECHO MEAS - RVOT AREA: 3.7 CM^2
BH CV ECHO MEAS - RVOT DIAM: 2.2 CM
BH CV ECHO MEAS - SI(AO): 160 ML/M^2
BH CV ECHO MEAS - SI(CUBED): 27.7 ML/M^2
BH CV ECHO MEAS - SI(LVOT): 40.8 ML/M^2
BH CV ECHO MEAS - SI(MOD-SP2): 25.8 ML/M^2
BH CV ECHO MEAS - SI(MOD-SP4): 26.8 ML/M^2
BH CV ECHO MEAS - SI(TEICH): 26.1 ML/M^2
BH CV ECHO MEAS - SV(AO): 328.4 ML
BH CV ECHO MEAS - SV(CUBED): 56.9 ML
BH CV ECHO MEAS - SV(LVOT): 83.7 ML
BH CV ECHO MEAS - SV(MOD-SP2): 53 ML
BH CV ECHO MEAS - SV(MOD-SP4): 55 ML
BH CV ECHO MEAS - SV(RVOT): 61.3 ML
BH CV ECHO MEAS - SV(TEICH): 53.5 ML
BH CV ECHO MEAS - TAPSE (>1.6): 2.4 CM2
BH CV ECHO MEAS - TR MAX VEL: 152 CM/SEC
BH CV XLRA - RV BASE: 3.4 CM
BH CV XLRA - TDI S': 15 CM/SEC
D DIMER PPP FEU-MCNC: 0.63 MCGFEU/ML (ref 0–0.49)
E/E' RATIO: 7
LEFT ATRIUM VOLUME INDEX: 29 ML/M2
LV EF 2D ECHO EST: 54 %
NT-PROBNP SERPL-MCNC: 57.6 PG/ML (ref 0–1800)
SINUS: 3.7 CM
STJ: 2.8 CM

## 2017-10-25 PROCEDURE — 83880 ASSAY OF NATRIURETIC PEPTIDE: CPT | Performed by: INTERNAL MEDICINE

## 2017-10-25 PROCEDURE — 36415 COLL VENOUS BLD VENIPUNCTURE: CPT | Performed by: INTERNAL MEDICINE

## 2017-10-25 PROCEDURE — 93306 TTE W/DOPPLER COMPLETE: CPT | Performed by: INTERNAL MEDICINE

## 2017-10-25 PROCEDURE — 85379 FIBRIN DEGRADATION QUANT: CPT

## 2017-10-25 PROCEDURE — 93306 TTE W/DOPPLER COMPLETE: CPT

## 2017-10-26 DIAGNOSIS — R79.89 ELEVATED D-DIMER: Primary | ICD-10-CM

## 2017-10-26 DIAGNOSIS — R06.02 SHORTNESS OF BREATH: ICD-10-CM

## 2017-11-03 ENCOUNTER — HOSPITAL ENCOUNTER (OUTPATIENT)
Dept: CT IMAGING | Facility: HOSPITAL | Age: 82
Discharge: HOME | End: 2017-11-03
Attending: INTERNAL MEDICINE | Admitting: INTERNAL MEDICINE

## 2017-11-03 DIAGNOSIS — R06.02 SHORTNESS OF BREATH: ICD-10-CM

## 2017-11-03 DIAGNOSIS — R79.89 ELEVATED D-DIMER: ICD-10-CM

## 2017-11-03 LAB — CREAT BLDA-MCNC: 1.1 MG/DL (ref 0.6–1.3)

## 2017-11-03 PROCEDURE — 71275 CT ANGIOGRAPHY CHEST: CPT

## 2017-11-03 PROCEDURE — 0 IOPAMIDOL PER 1 ML: Performed by: INTERNAL MEDICINE

## 2017-11-03 PROCEDURE — 82565 ASSAY OF CREATININE: CPT

## 2017-11-03 RX ADMIN — IOPAMIDOL 95 ML: 755 INJECTION, SOLUTION INTRAVENOUS at 14:07

## 2017-11-06 DIAGNOSIS — R06.09 DOE (DYSPNEA ON EXERTION): Primary | ICD-10-CM

## 2017-11-06 NOTE — PROGRESS NOTES
I tried to call him.  No answer.  No evidence of pulmonary embolus.  Labs show no evidence of heart failure and his echo looks fine.  He needs a pulmonary evaluation.  Please let him know.

## 2017-12-04 ENCOUNTER — TELEPHONE (OUTPATIENT)
Dept: INTERNAL MEDICINE | Facility: CLINIC | Age: 82
End: 2017-12-04

## 2017-12-04 RX ORDER — TERBINAFINE HYDROCHLORIDE 250 MG/1
250 TABLET ORAL DAILY
Qty: 30 TABLET | Refills: 2 | Status: SHIPPED | OUTPATIENT
Start: 2017-12-04 | End: 2018-04-24 | Stop reason: SDUPTHER

## 2017-12-04 NOTE — TELEPHONE ENCOUNTER
The pt's toenail fungus is coming back and she'd like a RX of Lamisil sent to the pharmacy for him

## 2018-02-13 DIAGNOSIS — Z79.899 HIGH RISK MEDICATION USE: Primary | ICD-10-CM

## 2018-02-18 ENCOUNTER — RESULTS ENCOUNTER (OUTPATIENT)
Dept: INTERNAL MEDICINE | Facility: CLINIC | Age: 83
End: 2018-02-18

## 2018-02-18 DIAGNOSIS — Z79.899 HIGH RISK MEDICATION USE: ICD-10-CM

## 2018-03-01 ENCOUNTER — TELEPHONE (OUTPATIENT)
Dept: INTERNAL MEDICINE | Facility: CLINIC | Age: 83
End: 2018-03-01

## 2018-03-01 DIAGNOSIS — R44.3 HALLUCINATIONS: ICD-10-CM

## 2018-03-01 DIAGNOSIS — R41.0 CONFUSION: Primary | ICD-10-CM

## 2018-03-01 NOTE — TELEPHONE ENCOUNTER
Pt had hallucinations this weekend and his wife called his Parkinsons nurse and she thinks he needs to be checked for a UTI and also maybe labs to check his Electrolytes?

## 2018-03-07 LAB
ALBUMIN SERPL-MCNC: 4.1 G/DL (ref 3.5–5.2)
ALP SERPL-CCNC: 145 U/L (ref 39–117)
ALT SERPL-CCNC: 9 U/L (ref 1–41)
APPEARANCE UR: CLEAR
AST SERPL-CCNC: 9 U/L (ref 1–40)
BACTERIA #/AREA URNS HPF: ABNORMAL /HPF
BASOPHILS # BLD AUTO: 0.03 10*3/MM3 (ref 0–0.2)
BASOPHILS NFR BLD AUTO: 0.3 % (ref 0–1.5)
BILIRUB DIRECT SERPL-MCNC: <0.2 MG/DL (ref 0–0.3)
BILIRUB SERPL-MCNC: 0.3 MG/DL (ref 0.1–1.2)
BILIRUB UR QL STRIP: NEGATIVE
BUN SERPL-MCNC: 18 MG/DL (ref 8–23)
BUN/CREAT SERPL: 17.8 (ref 7–25)
CALCIUM SERPL-MCNC: 9.5 MG/DL (ref 8.6–10.5)
CASTS URNS MICRO: ABNORMAL
CASTS URNS QL MICRO: PRESENT /LPF
CHLORIDE SERPL-SCNC: 102 MMOL/L (ref 98–107)
CO2 SERPL-SCNC: 25.6 MMOL/L (ref 22–29)
COLOR UR: YELLOW
CREAT SERPL-MCNC: 1.01 MG/DL (ref 0.76–1.27)
CRYSTALS URNS MICRO: ABNORMAL
EOSINOPHIL # BLD AUTO: 0.19 10*3/MM3 (ref 0–0.7)
EOSINOPHIL NFR BLD AUTO: 1.8 % (ref 0.3–6.2)
EPI CELLS #/AREA URNS HPF: ABNORMAL /HPF
ERYTHROCYTE [DISTWIDTH] IN BLOOD BY AUTOMATED COUNT: 14 % (ref 11.5–14.5)
GFR SERPLBLD CREATININE-BSD FMLA CKD-EPI: 71 ML/MIN/1.73
GFR SERPLBLD CREATININE-BSD FMLA CKD-EPI: 86 ML/MIN/1.73
GLUCOSE SERPL-MCNC: 83 MG/DL (ref 65–99)
GLUCOSE UR QL: NEGATIVE
HCT VFR BLD AUTO: 44.9 % (ref 40.4–52.2)
HGB BLD-MCNC: 14.8 G/DL (ref 13.7–17.6)
HGB UR QL STRIP: NEGATIVE
IMM GRANULOCYTES # BLD: 0.02 10*3/MM3 (ref 0–0.03)
IMM GRANULOCYTES NFR BLD: 0.2 % (ref 0–0.5)
KETONES UR QL STRIP: ABNORMAL
LEUKOCYTE ESTERASE UR QL STRIP: NEGATIVE
LYMPHOCYTES # BLD AUTO: 2.02 10*3/MM3 (ref 0.9–4.8)
LYMPHOCYTES NFR BLD AUTO: 19 % (ref 19.6–45.3)
MCH RBC QN AUTO: 27.8 PG (ref 27–32.7)
MCHC RBC AUTO-ENTMCNC: 33 G/DL (ref 32.6–36.4)
MCV RBC AUTO: 84.4 FL (ref 79.8–96.2)
MICRO URNS: ABNORMAL
MICRO URNS: ABNORMAL
MONOCYTES # BLD AUTO: 0.96 10*3/MM3 (ref 0.2–1.2)
MONOCYTES NFR BLD AUTO: 9 % (ref 5–12)
MUCOUS THREADS URNS QL MICRO: PRESENT /HPF
NEUTROPHILS # BLD AUTO: 7.43 10*3/MM3 (ref 1.9–8.1)
NEUTROPHILS NFR BLD AUTO: 69.7 % (ref 42.7–76)
NITRITE UR QL STRIP: NEGATIVE
PH UR STRIP: 6 [PH] (ref 5–7.5)
PLATELET # BLD AUTO: 248 10*3/MM3 (ref 140–500)
POTASSIUM SERPL-SCNC: 4.1 MMOL/L (ref 3.5–5.2)
PROT SERPL-MCNC: 7.1 G/DL (ref 6–8.5)
PROT UR QL STRIP: NEGATIVE
RBC # BLD AUTO: 5.32 10*6/MM3 (ref 4.6–6)
RBC #/AREA URNS HPF: ABNORMAL /HPF
SODIUM SERPL-SCNC: 143 MMOL/L (ref 136–145)
SP GR UR: 1.02 (ref 1–1.03)
UNIDENT CRYS URNS QL MICRO: PRESENT /LPF
URINALYSIS REFLEX: ABNORMAL
UROBILINOGEN UR STRIP-MCNC: 1 MG/DL (ref 0.2–1)
WBC # BLD AUTO: 10.65 10*3/MM3 (ref 4.5–10.7)
WBC #/AREA URNS HPF: ABNORMAL /HPF

## 2018-04-24 ENCOUNTER — OFFICE VISIT (OUTPATIENT)
Dept: INTERNAL MEDICINE | Facility: CLINIC | Age: 83
End: 2018-04-24

## 2018-04-24 VITALS
HEART RATE: 63 BPM | SYSTOLIC BLOOD PRESSURE: 142 MMHG | TEMPERATURE: 97.6 F | DIASTOLIC BLOOD PRESSURE: 88 MMHG | OXYGEN SATURATION: 98 %

## 2018-04-24 DIAGNOSIS — M51.36 DEGENERATION OF LUMBAR INTERVERTEBRAL DISC: ICD-10-CM

## 2018-04-24 DIAGNOSIS — R20.0 NUMBNESS OF LEFT FOOT: Primary | ICD-10-CM

## 2018-04-24 DIAGNOSIS — I10 ESSENTIAL HYPERTENSION: ICD-10-CM

## 2018-04-24 DIAGNOSIS — B35.1 ONYCHOMYCOSIS: ICD-10-CM

## 2018-04-24 DIAGNOSIS — R51.9 TEMPLE TENDERNESS: ICD-10-CM

## 2018-04-24 DIAGNOSIS — G20 PARKINSON DISEASE (HCC): ICD-10-CM

## 2018-04-24 PROCEDURE — 99214 OFFICE O/P EST MOD 30 MIN: CPT | Performed by: FAMILY MEDICINE

## 2018-04-24 RX ORDER — TERBINAFINE HYDROCHLORIDE 250 MG/1
250 TABLET ORAL DAILY
Qty: 30 TABLET | Refills: 2 | Status: SHIPPED | OUTPATIENT
Start: 2018-04-24 | End: 2020-03-30 | Stop reason: HOSPADM

## 2018-04-24 NOTE — PROGRESS NOTES
Subjective   Sp Malagon is a 82 y.o. male.     Chief Complaint   Patient presents with   • Hypertension   • Leg Pain   • Back Pain   Falling      History of Present Illness   Patient is bothered mostly by his movement disorder she is being [facility].  He is trying everything possible to increase movement and mobility but does have some falls.  He is using a walker for mobility mostly.    Treatment of hypertension hyperlipidemia are reviewed.  He's had some temporal pain or any get a sedimentation rate as well.    Recurrent back pain for numbness is reviewed.  He has a little swelling chronically of the right lower leg that has been evaluated in the past as well as no different.      The following portions of the patient's history were reviewed and updated as appropriate: allergies, current medications, past social history and problem list.    Review of Systems   Constitutional: Positive for fatigue.   HENT: Negative.    Eyes: Negative.    Respiratory: Negative.    Cardiovascular: Negative.    Gastrointestinal: Negative.    Genitourinary: Negative.    Musculoskeletal: Positive for back pain and gait problem.   Skin: Negative.    Allergic/Immunologic: Negative.    Neurological: Positive for weakness and numbness.   Hematological: Negative.    Psychiatric/Behavioral: Negative.        Objective   Vitals:    04/24/18 1343   BP: 142/88   Pulse: 63   Temp: 97.6 °F (36.4 °C)   SpO2: 98%     Physical Exam   Constitutional: He is oriented to person, place, and time. He appears well-developed and well-nourished.   HENT:   Head: Normocephalic.   Right Ear: External ear normal.   Left Ear: External ear normal.   Mouth/Throat: Oropharynx is clear and moist.   Eyes: EOM are normal. Pupils are equal, round, and reactive to light.   Neck: Normal range of motion. Neck supple.   Cardiovascular: Normal rate, regular rhythm and normal heart sounds.    Pulmonary/Chest: Effort normal and breath sounds normal.    Abdominal: Soft. Bowel sounds are normal.   Musculoskeletal:        Right hip: He exhibits decreased range of motion.        Left hip: He exhibits decreased range of motion.        Lumbar back: He exhibits decreased range of motion.   Neurological: He is alert and oriented to person, place, and time. He exhibits abnormal muscle tone. Coordination and gait abnormal.   Skin: Skin is warm and dry.   Psychiatric: He has a normal mood and affect. Cognition and memory are impaired.   Nursing note and vitals reviewed.      Assessment/Plan   Problem List Items Addressed This Visit        Cardiovascular and Mediastinum    Hypertension    Relevant Orders    CBC & Differential    Comprehensive Metabolic Panel    Lipid Panel With / Chol / HDL Ratio    Sedimentation Rate    Urinalysis With / Microscopic If Indicated - Urine, Clean Catch    Vitamin B12    TSH       Nervous and Auditory    Parkinson disease    Relevant Orders    CBC & Differential    Comprehensive Metabolic Panel    Lipid Panel With / Chol / HDL Ratio    Sedimentation Rate    Urinalysis With / Microscopic If Indicated - Urine, Clean Catch    Vitamin B12    TSH       Musculoskeletal and Integument    Onychomycosis    Relevant Medications    terbinafine (LAMISIL) 250 MG tablet    Degeneration of lumbar intervertebral disc    Relevant Orders    CBC & Differential    Comprehensive Metabolic Panel    Lipid Panel With / Chol / HDL Ratio    Sedimentation Rate    Urinalysis With / Microscopic If Indicated - Urine, Clean Catch    Vitamin B12    TSH      Other Visit Diagnoses     Numbness of left foot    -  Primary    Relevant Orders    CBC & Differential    Comprehensive Metabolic Panel    Lipid Panel With / Chol / HDL Ratio    Sedimentation Rate    Urinalysis With / Microscopic If Indicated - Urine, Clean Catch    Vitamin B12    TSH    Temple tenderness        Relevant Orders    CBC & Differential    Comprehensive Metabolic Panel    Lipid Panel With / Chol / HDL Ratio     Sedimentation Rate    Urinalysis With / Microscopic If Indicated - Urine, Clean Catch    Vitamin B12    TSH      Plan: Screening labs recheck in 3 months.  With a sedimentation rate Re: Eleva tenderness.

## 2018-04-25 LAB
ALBUMIN SERPL-MCNC: 4.6 G/DL (ref 3.5–5.2)
ALBUMIN/GLOB SERPL: 1.5 G/DL
ALP SERPL-CCNC: 149 U/L (ref 39–117)
ALT SERPL-CCNC: 8 U/L (ref 1–41)
APPEARANCE UR: ABNORMAL
AST SERPL-CCNC: 8 U/L (ref 1–40)
BACTERIA #/AREA URNS HPF: ABNORMAL /HPF
BASOPHILS # BLD AUTO: 0.03 10*3/MM3 (ref 0–0.2)
BASOPHILS NFR BLD AUTO: 0.3 % (ref 0–1.5)
BILIRUB SERPL-MCNC: 0.3 MG/DL (ref 0.1–1.2)
BILIRUB UR QL STRIP: ABNORMAL
BUN SERPL-MCNC: 21 MG/DL (ref 8–23)
BUN/CREAT SERPL: 19.8 (ref 7–25)
CALCIUM SERPL-MCNC: 9.8 MG/DL (ref 8.6–10.5)
CASTS URNS MICRO: ABNORMAL
CHLORIDE SERPL-SCNC: 100 MMOL/L (ref 98–107)
CHOLEST SERPL-MCNC: 192 MG/DL (ref 0–200)
CHOLEST/HDLC SERPL: 5.33 {RATIO}
CO2 SERPL-SCNC: 27.5 MMOL/L (ref 22–29)
COLOR UR: ABNORMAL
CREAT SERPL-MCNC: 1.06 MG/DL (ref 0.76–1.27)
CRYSTALS URNS MICRO: ABNORMAL
EOSINOPHIL # BLD AUTO: 0.23 10*3/MM3 (ref 0–0.7)
EOSINOPHIL NFR BLD AUTO: 2 % (ref 0.3–6.2)
EPI CELLS #/AREA URNS HPF: ABNORMAL /HPF
ERYTHROCYTE [DISTWIDTH] IN BLOOD BY AUTOMATED COUNT: 14.5 % (ref 11.5–14.5)
ERYTHROCYTE [SEDIMENTATION RATE] IN BLOOD BY WESTERGREN METHOD: 9 MM/HR (ref 0–20)
GFR SERPLBLD CREATININE-BSD FMLA CKD-EPI: 67 ML/MIN/1.73
GFR SERPLBLD CREATININE-BSD FMLA CKD-EPI: 81 ML/MIN/1.73
GLOBULIN SER CALC-MCNC: 3 GM/DL
GLUCOSE SERPL-MCNC: 95 MG/DL (ref 65–99)
GLUCOSE UR QL: NEGATIVE
HCT VFR BLD AUTO: 47.1 % (ref 40.4–52.2)
HDLC SERPL-MCNC: 36 MG/DL (ref 40–60)
HGB BLD-MCNC: 15.7 G/DL (ref 13.7–17.6)
HGB UR QL STRIP: ABNORMAL
IMM GRANULOCYTES # BLD: 0.03 10*3/MM3 (ref 0–0.03)
IMM GRANULOCYTES NFR BLD: 0.3 % (ref 0–0.5)
KETONES UR QL STRIP: ABNORMAL
LDLC SERPL CALC-MCNC: 116 MG/DL (ref 0–100)
LEUKOCYTE ESTERASE UR QL STRIP: NEGATIVE
LYMPHOCYTES # BLD AUTO: 2.73 10*3/MM3 (ref 0.9–4.8)
LYMPHOCYTES NFR BLD AUTO: 23.4 % (ref 19.6–45.3)
MCH RBC QN AUTO: 28.8 PG (ref 27–32.7)
MCHC RBC AUTO-ENTMCNC: 33.3 G/DL (ref 32.6–36.4)
MCV RBC AUTO: 86.3 FL (ref 79.8–96.2)
MONOCYTES # BLD AUTO: 1 10*3/MM3 (ref 0.2–1.2)
MONOCYTES NFR BLD AUTO: 8.6 % (ref 5–12)
MUCOUS THREADS URNS QL MICRO: ABNORMAL /HPF
NEUTROPHILS # BLD AUTO: 7.69 10*3/MM3 (ref 1.9–8.1)
NEUTROPHILS NFR BLD AUTO: 65.7 % (ref 42.7–76)
NITRITE UR QL STRIP: NEGATIVE
PH UR STRIP: 5.5 [PH] (ref 5–8)
PLATELET # BLD AUTO: 292 10*3/MM3 (ref 140–500)
POTASSIUM SERPL-SCNC: 4.2 MMOL/L (ref 3.5–5.2)
PROT SERPL-MCNC: 7.6 G/DL (ref 6–8.5)
PROT UR QL STRIP: ABNORMAL
RBC # BLD AUTO: 5.46 10*6/MM3 (ref 4.6–6)
RBC #/AREA URNS HPF: ABNORMAL /HPF
SODIUM SERPL-SCNC: 142 MMOL/L (ref 136–145)
SP GR UR: ABNORMAL (ref 1–1.03)
TRIGL SERPL-MCNC: 202 MG/DL (ref 0–150)
TSH SERPL DL<=0.005 MIU/L-ACNC: 2.18 MIU/ML (ref 0.27–4.2)
UROBILINOGEN UR STRIP-MCNC: ABNORMAL MG/DL
VIT B12 SERPL-MCNC: 282 PG/ML (ref 211–946)
VLDLC SERPL CALC-MCNC: 40.4 MG/DL (ref 5–40)
WBC # BLD AUTO: 11.68 10*3/MM3 (ref 4.5–10.7)
WBC #/AREA URNS HPF: ABNORMAL /HPF

## 2020-03-27 ENCOUNTER — HOSPITAL ENCOUNTER (OUTPATIENT)
Facility: HOSPITAL | Age: 85
Setting detail: OBSERVATION
LOS: 1 days | Discharge: NURSING, REHAB OR LONG-TERM CARE | End: 2020-03-30
Attending: EMERGENCY MEDICINE | Admitting: HOSPITALIST

## 2020-03-27 ENCOUNTER — APPOINTMENT (OUTPATIENT)
Dept: CT IMAGING | Facility: HOSPITAL | Age: 85
End: 2020-03-27

## 2020-03-27 ENCOUNTER — APPOINTMENT (OUTPATIENT)
Dept: MRI IMAGING | Facility: HOSPITAL | Age: 85
End: 2020-03-27

## 2020-03-27 DIAGNOSIS — G92.8 TOXIC METABOLIC ENCEPHALOPATHY: ICD-10-CM

## 2020-03-27 DIAGNOSIS — R47.9 SPEECH DISTURBANCE, UNSPECIFIED TYPE: ICD-10-CM

## 2020-03-27 DIAGNOSIS — N39.0 ACUTE UTI: Primary | ICD-10-CM

## 2020-03-27 DIAGNOSIS — R13.13 PHARYNGEAL DYSPHAGIA: ICD-10-CM

## 2020-03-27 DIAGNOSIS — G20 PARKINSON'S DISEASE (HCC): ICD-10-CM

## 2020-03-27 PROBLEM — R27.0 ATAXIA: Status: ACTIVE | Noted: 2020-03-27

## 2020-03-27 PROBLEM — R47.1 DYSARTHRIA: Status: ACTIVE | Noted: 2020-03-27

## 2020-03-27 LAB
ALBUMIN SERPL-MCNC: 4.1 G/DL (ref 3.5–5.2)
ALBUMIN/GLOB SERPL: 1.3 G/DL
ALP SERPL-CCNC: 109 U/L (ref 39–117)
ALT SERPL W P-5'-P-CCNC: <5 U/L (ref 1–41)
ANION GAP SERPL CALCULATED.3IONS-SCNC: 13 MMOL/L (ref 5–15)
AST SERPL-CCNC: 21 U/L (ref 1–40)
BACTERIA UR QL AUTO: ABNORMAL /HPF
BASOPHILS # BLD AUTO: 0.05 10*3/MM3 (ref 0–0.2)
BASOPHILS NFR BLD AUTO: 0.5 % (ref 0–1.5)
BILIRUB SERPL-MCNC: 0.8 MG/DL (ref 0.2–1.2)
BILIRUB UR QL STRIP: ABNORMAL
BUN BLD-MCNC: 21 MG/DL (ref 8–23)
BUN/CREAT SERPL: 20.2 (ref 7–25)
CALCIUM SPEC-SCNC: 9.1 MG/DL (ref 8.6–10.5)
CHLORIDE SERPL-SCNC: 104 MMOL/L (ref 98–107)
CLARITY UR: CLEAR
CO2 SERPL-SCNC: 25 MMOL/L (ref 22–29)
COLOR UR: ABNORMAL
CREAT BLD-MCNC: 1.04 MG/DL (ref 0.76–1.27)
DEPRECATED RDW RBC AUTO: 40.1 FL (ref 37–54)
EOSINOPHIL # BLD AUTO: 0.23 10*3/MM3 (ref 0–0.4)
EOSINOPHIL NFR BLD AUTO: 2.4 % (ref 0.3–6.2)
ERYTHROCYTE [DISTWIDTH] IN BLOOD BY AUTOMATED COUNT: 13.2 % (ref 12.3–15.4)
GFR SERPL CREATININE-BSD FRML MDRD: 68 ML/MIN/1.73
GLOBULIN UR ELPH-MCNC: 3.2 GM/DL
GLUCOSE BLD-MCNC: 95 MG/DL (ref 65–99)
GLUCOSE BLDC GLUCOMTR-MCNC: 89 MG/DL (ref 70–130)
GLUCOSE UR STRIP-MCNC: NEGATIVE MG/DL
HCT VFR BLD AUTO: 39.7 % (ref 37.5–51)
HGB BLD-MCNC: 13.8 G/DL (ref 13–17.7)
HGB UR QL STRIP.AUTO: NEGATIVE
HYALINE CASTS UR QL AUTO: ABNORMAL /LPF
IMM GRANULOCYTES # BLD AUTO: 0.06 10*3/MM3 (ref 0–0.05)
IMM GRANULOCYTES NFR BLD AUTO: 0.6 % (ref 0–0.5)
INR PPP: 1.11 (ref 0.9–1.1)
KETONES UR QL STRIP: ABNORMAL
LEUKOCYTE ESTERASE UR QL STRIP.AUTO: ABNORMAL
LYMPHOCYTES # BLD AUTO: 1.61 10*3/MM3 (ref 0.7–3.1)
LYMPHOCYTES NFR BLD AUTO: 17.1 % (ref 19.6–45.3)
MAGNESIUM SERPL-MCNC: 2.2 MG/DL (ref 1.6–2.4)
MCH RBC QN AUTO: 28.7 PG (ref 26.6–33)
MCHC RBC AUTO-ENTMCNC: 34.8 G/DL (ref 31.5–35.7)
MCV RBC AUTO: 82.5 FL (ref 79–97)
MONOCYTES # BLD AUTO: 0.78 10*3/MM3 (ref 0.1–0.9)
MONOCYTES NFR BLD AUTO: 8.3 % (ref 5–12)
NEUTROPHILS # BLD AUTO: 6.71 10*3/MM3 (ref 1.7–7)
NEUTROPHILS NFR BLD AUTO: 71.1 % (ref 42.7–76)
NITRITE UR QL STRIP: POSITIVE
NRBC BLD AUTO-RTO: 0.1 /100 WBC (ref 0–0.2)
PH UR STRIP.AUTO: <=5 [PH] (ref 5–8)
PLATELET # BLD AUTO: 301 10*3/MM3 (ref 140–450)
PMV BLD AUTO: 8.8 FL (ref 6–12)
POTASSIUM BLD-SCNC: 3.6 MMOL/L (ref 3.5–5.2)
PROT SERPL-MCNC: 7.3 G/DL (ref 6–8.5)
PROT UR QL STRIP: ABNORMAL
PROTHROMBIN TIME: 14 SECONDS (ref 11.7–14.2)
RBC # BLD AUTO: 4.81 10*6/MM3 (ref 4.14–5.8)
RBC # UR: ABNORMAL /HPF
REF LAB TEST METHOD: ABNORMAL
SODIUM BLD-SCNC: 142 MMOL/L (ref 136–145)
SP GR UR STRIP: >=1.03 (ref 1–1.03)
SQUAMOUS #/AREA URNS HPF: ABNORMAL /HPF
TROPONIN T SERPL-MCNC: <0.01 NG/ML (ref 0–0.03)
UROBILINOGEN UR QL STRIP: ABNORMAL
WBC NRBC COR # BLD: 9.44 10*3/MM3 (ref 3.4–10.8)
WBC UR QL AUTO: ABNORMAL /HPF

## 2020-03-27 PROCEDURE — 93005 ELECTROCARDIOGRAM TRACING: CPT | Performed by: EMERGENCY MEDICINE

## 2020-03-27 PROCEDURE — 81001 URINALYSIS AUTO W/SCOPE: CPT | Performed by: EMERGENCY MEDICINE

## 2020-03-27 PROCEDURE — G0378 HOSPITAL OBSERVATION PER HR: HCPCS

## 2020-03-27 PROCEDURE — 99284 EMERGENCY DEPT VISIT MOD MDM: CPT

## 2020-03-27 PROCEDURE — 85610 PROTHROMBIN TIME: CPT | Performed by: EMERGENCY MEDICINE

## 2020-03-27 PROCEDURE — 96361 HYDRATE IV INFUSION ADD-ON: CPT

## 2020-03-27 PROCEDURE — 80053 COMPREHEN METABOLIC PANEL: CPT | Performed by: EMERGENCY MEDICINE

## 2020-03-27 PROCEDURE — 83735 ASSAY OF MAGNESIUM: CPT | Performed by: EMERGENCY MEDICINE

## 2020-03-27 PROCEDURE — 84484 ASSAY OF TROPONIN QUANT: CPT | Performed by: EMERGENCY MEDICINE

## 2020-03-27 PROCEDURE — 25010000003 CEFTRIAXONE PER 250 MG: Performed by: EMERGENCY MEDICINE

## 2020-03-27 PROCEDURE — 85025 COMPLETE CBC W/AUTO DIFF WBC: CPT | Performed by: EMERGENCY MEDICINE

## 2020-03-27 PROCEDURE — 93010 ELECTROCARDIOGRAM REPORT: CPT | Performed by: INTERNAL MEDICINE

## 2020-03-27 PROCEDURE — 96365 THER/PROPH/DIAG IV INF INIT: CPT

## 2020-03-27 PROCEDURE — 70450 CT HEAD/BRAIN W/O DYE: CPT

## 2020-03-27 PROCEDURE — 82962 GLUCOSE BLOOD TEST: CPT

## 2020-03-27 RX ORDER — ONDANSETRON 2 MG/ML
4 INJECTION INTRAMUSCULAR; INTRAVENOUS EVERY 6 HOURS PRN
Status: DISCONTINUED | OUTPATIENT
Start: 2020-03-27 | End: 2020-03-30 | Stop reason: HOSPADM

## 2020-03-27 RX ORDER — ATORVASTATIN CALCIUM 80 MG/1
80 TABLET, FILM COATED ORAL NIGHTLY
Status: DISCONTINUED | OUTPATIENT
Start: 2020-03-27 | End: 2020-03-30 | Stop reason: HOSPADM

## 2020-03-27 RX ORDER — SODIUM CHLORIDE 9 MG/ML
75 INJECTION, SOLUTION INTRAVENOUS CONTINUOUS
Status: DISCONTINUED | OUTPATIENT
Start: 2020-03-27 | End: 2020-03-30

## 2020-03-27 RX ORDER — CARBIDOPA AND LEVODOPA 25; 100 MG/1; MG/1
1 TABLET, EXTENDED RELEASE ORAL 4 TIMES DAILY
Status: DISCONTINUED | OUTPATIENT
Start: 2020-03-27 | End: 2020-03-30 | Stop reason: HOSPADM

## 2020-03-27 RX ORDER — CHOLECALCIFEROL (VITAMIN D3) 125 MCG
500 CAPSULE ORAL DAILY
COMMUNITY

## 2020-03-27 RX ORDER — LANOLIN ALCOHOL/MO/W.PET/CERES
100 CREAM (GRAM) TOPICAL DAILY
COMMUNITY

## 2020-03-27 RX ORDER — AMLODIPINE BESYLATE 10 MG/1
10 TABLET ORAL EVERY MORNING
Status: DISCONTINUED | OUTPATIENT
Start: 2020-03-28 | End: 2020-03-30 | Stop reason: HOSPADM

## 2020-03-27 RX ORDER — BISACODYL 10 MG
10 SUPPOSITORY, RECTAL RECTAL DAILY PRN
Status: DISCONTINUED | OUTPATIENT
Start: 2020-03-27 | End: 2020-03-30 | Stop reason: HOSPADM

## 2020-03-27 RX ORDER — CETIRIZINE HYDROCHLORIDE 5 MG/1
5 TABLET ORAL DAILY
COMMUNITY
End: 2020-05-09 | Stop reason: HOSPADM

## 2020-03-27 RX ORDER — RIVASTIGMINE TARTRATE 1.5 MG/1
1.5 CAPSULE ORAL DAILY
COMMUNITY

## 2020-03-27 RX ORDER — LANOLIN ALCOHOL/MO/W.PET/CERES
100 CREAM (GRAM) TOPICAL DAILY
Status: DISCONTINUED | OUTPATIENT
Start: 2020-03-28 | End: 2020-03-30 | Stop reason: HOSPADM

## 2020-03-27 RX ORDER — ASPIRIN 300 MG/1
300 SUPPOSITORY RECTAL DAILY
Status: DISCONTINUED | OUTPATIENT
Start: 2020-03-28 | End: 2020-03-30 | Stop reason: HOSPADM

## 2020-03-27 RX ORDER — ALFUZOSIN HYDROCHLORIDE 10 MG/1
10 TABLET, EXTENDED RELEASE ORAL DAILY
COMMUNITY

## 2020-03-27 RX ORDER — TAMSULOSIN HYDROCHLORIDE 0.4 MG/1
0.4 CAPSULE ORAL NIGHTLY
Status: DISCONTINUED | OUTPATIENT
Start: 2020-03-27 | End: 2020-03-30 | Stop reason: HOSPADM

## 2020-03-27 RX ORDER — FINASTERIDE 5 MG/1
5 TABLET, FILM COATED ORAL DAILY
Status: DISCONTINUED | OUTPATIENT
Start: 2020-03-28 | End: 2020-03-30 | Stop reason: HOSPADM

## 2020-03-27 RX ORDER — ACETAMINOPHEN 325 MG/1
650 TABLET ORAL EVERY 4 HOURS PRN
Status: DISCONTINUED | OUTPATIENT
Start: 2020-03-27 | End: 2020-03-30 | Stop reason: HOSPADM

## 2020-03-27 RX ORDER — PANTOPRAZOLE SODIUM 40 MG/1
40 TABLET, DELAYED RELEASE ORAL EVERY MORNING
Status: DISCONTINUED | OUTPATIENT
Start: 2020-03-28 | End: 2020-03-30 | Stop reason: HOSPADM

## 2020-03-27 RX ORDER — CEFTRIAXONE SODIUM 2 G/50ML
2 INJECTION, SOLUTION INTRAVENOUS ONCE
Status: COMPLETED | OUTPATIENT
Start: 2020-03-27 | End: 2020-03-27

## 2020-03-27 RX ORDER — CHOLECALCIFEROL (VITAMIN D3) 125 MCG
500 CAPSULE ORAL DAILY
Status: DISCONTINUED | OUTPATIENT
Start: 2020-03-28 | End: 2020-03-30 | Stop reason: HOSPADM

## 2020-03-27 RX ORDER — SODIUM CHLORIDE 9 MG/ML
125 INJECTION, SOLUTION INTRAVENOUS CONTINUOUS
Status: DISCONTINUED | OUTPATIENT
Start: 2020-03-27 | End: 2020-03-27

## 2020-03-27 RX ORDER — RIVASTIGMINE TARTRATE 1.5 MG/1
1.5 CAPSULE ORAL 2 TIMES DAILY WITH MEALS
Status: DISCONTINUED | OUTPATIENT
Start: 2020-03-27 | End: 2020-03-30 | Stop reason: HOSPADM

## 2020-03-27 RX ORDER — ASPIRIN 325 MG
325 TABLET ORAL DAILY
Status: DISCONTINUED | OUTPATIENT
Start: 2020-03-28 | End: 2020-03-30 | Stop reason: HOSPADM

## 2020-03-27 RX ORDER — SODIUM CHLORIDE 0.9 % (FLUSH) 0.9 %
10 SYRINGE (ML) INJECTION AS NEEDED
Status: DISCONTINUED | OUTPATIENT
Start: 2020-03-27 | End: 2020-03-30 | Stop reason: HOSPADM

## 2020-03-27 RX ORDER — ASPIRIN 300 MG/1
300 SUPPOSITORY RECTAL ONCE
Status: COMPLETED | OUTPATIENT
Start: 2020-03-27 | End: 2020-03-27

## 2020-03-27 RX ORDER — FINASTERIDE 5 MG/1
5 TABLET, FILM COATED ORAL DAILY
COMMUNITY

## 2020-03-27 RX ORDER — ACETAMINOPHEN 650 MG/1
650 SUPPOSITORY RECTAL EVERY 4 HOURS PRN
Status: DISCONTINUED | OUTPATIENT
Start: 2020-03-27 | End: 2020-03-30 | Stop reason: HOSPADM

## 2020-03-27 RX ADMIN — ASPIRIN 300 MG: 300 SUPPOSITORY RECTAL at 16:26

## 2020-03-27 RX ADMIN — SODIUM CHLORIDE 500 ML: 9 INJECTION, SOLUTION INTRAVENOUS at 16:26

## 2020-03-27 RX ADMIN — CEFTRIAXONE SODIUM 2 G: 2 INJECTION, SOLUTION INTRAVENOUS at 16:14

## 2020-03-27 RX ADMIN — SODIUM CHLORIDE 75 ML/HR: 9 INJECTION, SOLUTION INTRAVENOUS at 20:56

## 2020-03-27 RX ADMIN — SODIUM CHLORIDE 125 ML/HR: 9 INJECTION, SOLUTION INTRAVENOUS at 16:26

## 2020-03-28 LAB
ALBUMIN SERPL-MCNC: 3.9 G/DL (ref 3.5–5.2)
ALBUMIN/GLOB SERPL: 1.3 G/DL
ALP SERPL-CCNC: 105 U/L (ref 39–117)
ALT SERPL W P-5'-P-CCNC: 17 U/L (ref 1–41)
ANION GAP SERPL CALCULATED.3IONS-SCNC: 13.1 MMOL/L (ref 5–15)
AST SERPL-CCNC: 18 U/L (ref 1–40)
BILIRUB SERPL-MCNC: 0.7 MG/DL (ref 0.2–1.2)
BUN BLD-MCNC: 17 MG/DL (ref 8–23)
BUN/CREAT SERPL: 21.3 (ref 7–25)
CALCIUM SPEC-SCNC: 8.8 MG/DL (ref 8.6–10.5)
CHLORIDE SERPL-SCNC: 106 MMOL/L (ref 98–107)
CHOLEST SERPL-MCNC: 133 MG/DL (ref 0–200)
CO2 SERPL-SCNC: 24.9 MMOL/L (ref 22–29)
CREAT BLD-MCNC: 0.8 MG/DL (ref 0.76–1.27)
DEPRECATED RDW RBC AUTO: 40.4 FL (ref 37–54)
ERYTHROCYTE [DISTWIDTH] IN BLOOD BY AUTOMATED COUNT: 13.4 % (ref 12.3–15.4)
GFR SERPL CREATININE-BSD FRML MDRD: 93 ML/MIN/1.73
GLOBULIN UR ELPH-MCNC: 3 GM/DL
GLUCOSE BLD-MCNC: 88 MG/DL (ref 65–99)
GLUCOSE BLDC GLUCOMTR-MCNC: 87 MG/DL (ref 70–130)
GLUCOSE BLDC GLUCOMTR-MCNC: 96 MG/DL (ref 70–130)
HBA1C MFR BLD: 5.23 % (ref 4.8–5.6)
HCT VFR BLD AUTO: 38.2 % (ref 37.5–51)
HDLC SERPL-MCNC: 31 MG/DL (ref 40–60)
HGB BLD-MCNC: 13.2 G/DL (ref 13–17.7)
LDLC SERPL CALC-MCNC: 82 MG/DL (ref 0–100)
LDLC/HDLC SERPL: 2.65 {RATIO}
MCH RBC QN AUTO: 28.8 PG (ref 26.6–33)
MCHC RBC AUTO-ENTMCNC: 34.6 G/DL (ref 31.5–35.7)
MCV RBC AUTO: 83.4 FL (ref 79–97)
PLATELET # BLD AUTO: 307 10*3/MM3 (ref 140–450)
PMV BLD AUTO: 8.9 FL (ref 6–12)
POTASSIUM BLD-SCNC: 3.8 MMOL/L (ref 3.5–5.2)
PROT SERPL-MCNC: 6.9 G/DL (ref 6–8.5)
RBC # BLD AUTO: 4.58 10*6/MM3 (ref 4.14–5.8)
SODIUM BLD-SCNC: 144 MMOL/L (ref 136–145)
TRIGL SERPL-MCNC: 100 MG/DL (ref 0–150)
TSH SERPL DL<=0.05 MIU/L-ACNC: 1.58 UIU/ML (ref 0.27–4.2)
VIT B12 BLD-MCNC: 900 PG/ML (ref 211–946)
VLDLC SERPL-MCNC: 20 MG/DL (ref 5–40)
WBC NRBC COR # BLD: 9.77 10*3/MM3 (ref 3.4–10.8)

## 2020-03-28 PROCEDURE — 92610 EVALUATE SWALLOWING FUNCTION: CPT

## 2020-03-28 PROCEDURE — 83036 HEMOGLOBIN GLYCOSYLATED A1C: CPT | Performed by: HOSPITALIST

## 2020-03-28 PROCEDURE — 80061 LIPID PANEL: CPT | Performed by: HOSPITALIST

## 2020-03-28 PROCEDURE — 97163 PT EVAL HIGH COMPLEX 45 MIN: CPT | Performed by: PHYSICAL THERAPIST

## 2020-03-28 PROCEDURE — 85027 COMPLETE CBC AUTOMATED: CPT | Performed by: HOSPITALIST

## 2020-03-28 PROCEDURE — 80053 COMPREHEN METABOLIC PANEL: CPT | Performed by: HOSPITALIST

## 2020-03-28 PROCEDURE — 96361 HYDRATE IV INFUSION ADD-ON: CPT

## 2020-03-28 PROCEDURE — 97166 OT EVAL MOD COMPLEX 45 MIN: CPT | Performed by: OCCUPATIONAL THERAPIST

## 2020-03-28 PROCEDURE — 82962 GLUCOSE BLOOD TEST: CPT

## 2020-03-28 PROCEDURE — G0378 HOSPITAL OBSERVATION PER HR: HCPCS

## 2020-03-28 PROCEDURE — 99203 OFFICE O/P NEW LOW 30 MIN: CPT | Performed by: PSYCHIATRY & NEUROLOGY

## 2020-03-28 PROCEDURE — 84443 ASSAY THYROID STIM HORMONE: CPT | Performed by: HOSPITALIST

## 2020-03-28 PROCEDURE — 97110 THERAPEUTIC EXERCISES: CPT | Performed by: OCCUPATIONAL THERAPIST

## 2020-03-28 PROCEDURE — 82607 VITAMIN B-12: CPT | Performed by: HOSPITALIST

## 2020-03-28 PROCEDURE — 97110 THERAPEUTIC EXERCISES: CPT | Performed by: PHYSICAL THERAPIST

## 2020-03-28 RX ORDER — LORAZEPAM 2 MG/ML
0.5 INJECTION INTRAMUSCULAR ONCE AS NEEDED
Status: COMPLETED | OUTPATIENT
Start: 2020-03-28 | End: 2020-03-29

## 2020-03-28 RX ADMIN — CARBIDOPA AND LEVODOPA 1 TABLET: 25; 100 TABLET, EXTENDED RELEASE ORAL at 20:57

## 2020-03-28 RX ADMIN — ATORVASTATIN CALCIUM 80 MG: 80 TABLET, FILM COATED ORAL at 20:57

## 2020-03-28 RX ADMIN — TAMSULOSIN HYDROCHLORIDE 0.4 MG: 0.4 CAPSULE ORAL at 20:57

## 2020-03-28 RX ADMIN — RIVASTIGMINE TARTRATE 1.5 MG: 1.5 CAPSULE ORAL at 18:32

## 2020-03-28 RX ADMIN — CARBIDOPA AND LEVODOPA 1 TABLET: 25; 100 TABLET, EXTENDED RELEASE ORAL at 18:32

## 2020-03-28 RX ADMIN — CARBIDOPA AND LEVODOPA 1 TABLET: 25; 100 TABLET, EXTENDED RELEASE ORAL at 13:13

## 2020-03-29 ENCOUNTER — APPOINTMENT (OUTPATIENT)
Dept: MRI IMAGING | Facility: HOSPITAL | Age: 85
End: 2020-03-29

## 2020-03-29 PROCEDURE — G0378 HOSPITAL OBSERVATION PER HR: HCPCS

## 2020-03-29 PROCEDURE — 99214 OFFICE O/P EST MOD 30 MIN: CPT | Performed by: NURSE PRACTITIONER

## 2020-03-29 PROCEDURE — 70551 MRI BRAIN STEM W/O DYE: CPT

## 2020-03-29 PROCEDURE — 96375 TX/PRO/DX INJ NEW DRUG ADDON: CPT

## 2020-03-29 PROCEDURE — 25010000002 LORAZEPAM PER 2 MG: Performed by: HOSPITALIST

## 2020-03-29 RX ADMIN — CARBIDOPA AND LEVODOPA 1 TABLET: 25; 100 TABLET, EXTENDED RELEASE ORAL at 21:19

## 2020-03-29 RX ADMIN — PANTOPRAZOLE SODIUM 40 MG: 40 TABLET, DELAYED RELEASE ORAL at 06:09

## 2020-03-29 RX ADMIN — ASPIRIN 325 MG: 325 TABLET ORAL at 08:06

## 2020-03-29 RX ADMIN — CARBIDOPA AND LEVODOPA 1 TABLET: 25; 100 TABLET, EXTENDED RELEASE ORAL at 11:47

## 2020-03-29 RX ADMIN — RIVASTIGMINE TARTRATE 1.5 MG: 1.5 CAPSULE ORAL at 17:26

## 2020-03-29 RX ADMIN — Medication 500 MCG: at 08:06

## 2020-03-29 RX ADMIN — ATORVASTATIN CALCIUM 80 MG: 80 TABLET, FILM COATED ORAL at 21:18

## 2020-03-29 RX ADMIN — LORAZEPAM 0.5 MG: 2 INJECTION INTRAMUSCULAR; INTRAVENOUS at 12:00

## 2020-03-29 RX ADMIN — Medication 100 MG: at 08:06

## 2020-03-29 RX ADMIN — AMLODIPINE BESYLATE 10 MG: 10 TABLET ORAL at 06:09

## 2020-03-29 RX ADMIN — CARBIDOPA AND LEVODOPA 1 TABLET: 25; 100 TABLET, EXTENDED RELEASE ORAL at 08:06

## 2020-03-29 RX ADMIN — CARBIDOPA AND LEVODOPA 1 TABLET: 25; 100 TABLET, EXTENDED RELEASE ORAL at 17:26

## 2020-03-29 RX ADMIN — FINASTERIDE 5 MG: 5 TABLET, FILM COATED ORAL at 08:06

## 2020-03-29 RX ADMIN — RIVASTIGMINE TARTRATE 1.5 MG: 1.5 CAPSULE ORAL at 08:07

## 2020-03-29 RX ADMIN — TAMSULOSIN HYDROCHLORIDE 0.4 MG: 0.4 CAPSULE ORAL at 21:18

## 2020-03-30 VITALS
RESPIRATION RATE: 18 BRPM | WEIGHT: 189.6 LBS | HEART RATE: 70 BPM | DIASTOLIC BLOOD PRESSURE: 90 MMHG | SYSTOLIC BLOOD PRESSURE: 174 MMHG | BODY MASS INDEX: 28.73 KG/M2 | OXYGEN SATURATION: 98 % | TEMPERATURE: 98.1 F | HEIGHT: 68 IN

## 2020-03-30 PROCEDURE — 99213 OFFICE O/P EST LOW 20 MIN: CPT | Performed by: NURSE PRACTITIONER

## 2020-03-30 PROCEDURE — G0378 HOSPITAL OBSERVATION PER HR: HCPCS

## 2020-03-30 PROCEDURE — 92526 ORAL FUNCTION THERAPY: CPT | Performed by: SPEECH-LANGUAGE PATHOLOGIST

## 2020-03-30 RX ORDER — ATORVASTATIN CALCIUM 80 MG/1
80 TABLET, FILM COATED ORAL NIGHTLY
Start: 2020-03-30 | End: 2020-03-30 | Stop reason: HOSPADM

## 2020-03-30 RX ORDER — ASPIRIN 325 MG
325 TABLET ORAL DAILY
Start: 2020-03-31

## 2020-03-30 RX ADMIN — ASPIRIN 325 MG: 325 TABLET ORAL at 08:45

## 2020-03-30 RX ADMIN — Medication 100 MG: at 08:45

## 2020-03-30 RX ADMIN — PANTOPRAZOLE SODIUM 40 MG: 40 TABLET, DELAYED RELEASE ORAL at 06:27

## 2020-03-30 RX ADMIN — CARBIDOPA AND LEVODOPA 1 TABLET: 25; 100 TABLET, EXTENDED RELEASE ORAL at 08:45

## 2020-03-30 RX ADMIN — RIVASTIGMINE TARTRATE 1.5 MG: 1.5 CAPSULE ORAL at 08:45

## 2020-03-30 RX ADMIN — Medication 500 MCG: at 08:45

## 2020-03-30 RX ADMIN — RIVASTIGMINE TARTRATE 1.5 MG: 1.5 CAPSULE ORAL at 17:28

## 2020-03-30 RX ADMIN — CARBIDOPA AND LEVODOPA 1 TABLET: 25; 100 TABLET, EXTENDED RELEASE ORAL at 17:28

## 2020-03-30 RX ADMIN — AMLODIPINE BESYLATE 10 MG: 10 TABLET ORAL at 06:27

## 2020-03-30 RX ADMIN — CARBIDOPA AND LEVODOPA 1 TABLET: 25; 100 TABLET, EXTENDED RELEASE ORAL at 12:16

## 2020-03-30 RX ADMIN — FINASTERIDE 5 MG: 5 TABLET, FILM COATED ORAL at 08:45

## 2020-04-28 ENCOUNTER — HOSPITAL ENCOUNTER (INPATIENT)
Facility: HOSPITAL | Age: 85
LOS: 11 days | Discharge: NURSING, REHAB OR LONG-TERM CARE | End: 2020-05-09
Attending: EMERGENCY MEDICINE | Admitting: INTERNAL MEDICINE

## 2020-04-28 ENCOUNTER — APPOINTMENT (OUTPATIENT)
Dept: CT IMAGING | Facility: HOSPITAL | Age: 85
End: 2020-04-28

## 2020-04-28 ENCOUNTER — APPOINTMENT (OUTPATIENT)
Dept: GENERAL RADIOLOGY | Facility: HOSPITAL | Age: 85
End: 2020-04-28

## 2020-04-28 DIAGNOSIS — E87.0 HYPERNATREMIA: ICD-10-CM

## 2020-04-28 DIAGNOSIS — N17.9 ACUTE RENAL FAILURE, UNSPECIFIED ACUTE RENAL FAILURE TYPE (HCC): Primary | ICD-10-CM

## 2020-04-28 LAB
ALBUMIN SERPL-MCNC: 3.9 G/DL (ref 3.5–5.2)
ALBUMIN/GLOB SERPL: 1.1 G/DL
ALP SERPL-CCNC: 124 U/L (ref 39–117)
ALT SERPL W P-5'-P-CCNC: 69 U/L (ref 1–41)
ANION GAP SERPL CALCULATED.3IONS-SCNC: 12.7 MMOL/L (ref 5–15)
ANION GAP SERPL CALCULATED.3IONS-SCNC: 14.2 MMOL/L (ref 5–15)
AST SERPL-CCNC: 48 U/L (ref 1–40)
BACTERIA UR QL AUTO: ABNORMAL /HPF
BASOPHILS # BLD AUTO: 0.04 10*3/MM3 (ref 0–0.2)
BASOPHILS NFR BLD AUTO: 0.3 % (ref 0–1.5)
BILIRUB SERPL-MCNC: 2 MG/DL (ref 0.2–1.2)
BILIRUB UR QL STRIP: NEGATIVE
BUN BLD-MCNC: 64 MG/DL (ref 8–23)
BUN BLD-MCNC: 66 MG/DL (ref 8–23)
BUN/CREAT SERPL: 34 (ref 7–25)
BUN/CREAT SERPL: 35.2 (ref 7–25)
CALCIUM SPEC-SCNC: 9 MG/DL (ref 8.6–10.5)
CALCIUM SPEC-SCNC: 9.9 MG/DL (ref 8.6–10.5)
CHLORIDE SERPL-SCNC: 129 MMOL/L (ref 98–107)
CHLORIDE SERPL-SCNC: 131 MMOL/L (ref 98–107)
CLARITY UR: CLEAR
CO2 SERPL-SCNC: 23.3 MMOL/L (ref 22–29)
CO2 SERPL-SCNC: 26.8 MMOL/L (ref 22–29)
COLOR UR: ABNORMAL
CREAT BLD-MCNC: 1.82 MG/DL (ref 0.76–1.27)
CREAT BLD-MCNC: 1.94 MG/DL (ref 0.76–1.27)
DEPRECATED RDW RBC AUTO: 45 FL (ref 37–54)
EOSINOPHIL # BLD AUTO: 0.07 10*3/MM3 (ref 0–0.4)
EOSINOPHIL NFR BLD AUTO: 0.6 % (ref 0.3–6.2)
ERYTHROCYTE [DISTWIDTH] IN BLOOD BY AUTOMATED COUNT: 14.6 % (ref 12.3–15.4)
GFR SERPL CREATININE-BSD FRML MDRD: 33 ML/MIN/1.73
GFR SERPL CREATININE-BSD FRML MDRD: 36 ML/MIN/1.73
GLOBULIN UR ELPH-MCNC: 3.6 GM/DL
GLUCOSE BLD-MCNC: 106 MG/DL (ref 65–99)
GLUCOSE BLD-MCNC: 260 MG/DL (ref 65–99)
GLUCOSE UR STRIP-MCNC: NEGATIVE MG/DL
HCT VFR BLD AUTO: 49.2 % (ref 37.5–51)
HGB BLD-MCNC: 16.8 G/DL (ref 13–17.7)
HGB UR QL STRIP.AUTO: NEGATIVE
HYALINE CASTS UR QL AUTO: ABNORMAL /LPF
IMM GRANULOCYTES # BLD AUTO: 0.05 10*3/MM3 (ref 0–0.05)
IMM GRANULOCYTES NFR BLD AUTO: 0.4 % (ref 0–0.5)
KETONES UR QL STRIP: ABNORMAL
LEUKOCYTE ESTERASE UR QL STRIP.AUTO: ABNORMAL
LYMPHOCYTES # BLD AUTO: 1.73 10*3/MM3 (ref 0.7–3.1)
LYMPHOCYTES NFR BLD AUTO: 14.9 % (ref 19.6–45.3)
MCH RBC QN AUTO: 29.5 PG (ref 26.6–33)
MCHC RBC AUTO-ENTMCNC: 34.1 G/DL (ref 31.5–35.7)
MCV RBC AUTO: 86.3 FL (ref 79–97)
MONOCYTES # BLD AUTO: 0.61 10*3/MM3 (ref 0.1–0.9)
MONOCYTES NFR BLD AUTO: 5.3 % (ref 5–12)
NEUTROPHILS # BLD AUTO: 9.1 10*3/MM3 (ref 1.7–7)
NEUTROPHILS NFR BLD AUTO: 78.5 % (ref 42.7–76)
NITRITE UR QL STRIP: POSITIVE
NRBC BLD AUTO-RTO: 0 /100 WBC (ref 0–0.2)
PH UR STRIP.AUTO: <=5 [PH] (ref 5–8)
PLATELET # BLD AUTO: 180 10*3/MM3 (ref 140–450)
PMV BLD AUTO: 11.4 FL (ref 6–12)
POTASSIUM BLD-SCNC: 3.5 MMOL/L (ref 3.5–5.2)
POTASSIUM BLD-SCNC: 3.8 MMOL/L (ref 3.5–5.2)
PROT SERPL-MCNC: 7.5 G/DL (ref 6–8.5)
PROT UR QL STRIP: NEGATIVE
RBC # BLD AUTO: 5.7 10*6/MM3 (ref 4.14–5.8)
RBC # UR: ABNORMAL /HPF
REF LAB TEST METHOD: ABNORMAL
SODIUM BLD-SCNC: 167 MMOL/L (ref 136–145)
SODIUM BLD-SCNC: 170 MMOL/L (ref 136–145)
SP GR UR STRIP: 1.03 (ref 1–1.03)
SQUAMOUS #/AREA URNS HPF: ABNORMAL /HPF
TROPONIN T SERPL-MCNC: 0.02 NG/ML (ref 0–0.03)
UROBILINOGEN UR QL STRIP: ABNORMAL
WBC NRBC COR # BLD: 11.6 10*3/MM3 (ref 3.4–10.8)
WBC UR QL AUTO: ABNORMAL /HPF

## 2020-04-28 PROCEDURE — 36415 COLL VENOUS BLD VENIPUNCTURE: CPT

## 2020-04-28 PROCEDURE — 85025 COMPLETE CBC W/AUTO DIFF WBC: CPT | Performed by: PHYSICIAN ASSISTANT

## 2020-04-28 PROCEDURE — 99285 EMERGENCY DEPT VISIT HI MDM: CPT

## 2020-04-28 PROCEDURE — P9612 CATHETERIZE FOR URINE SPEC: HCPCS

## 2020-04-28 PROCEDURE — 81001 URINALYSIS AUTO W/SCOPE: CPT | Performed by: PHYSICIAN ASSISTANT

## 2020-04-28 PROCEDURE — 84484 ASSAY OF TROPONIN QUANT: CPT | Performed by: PHYSICIAN ASSISTANT

## 2020-04-28 PROCEDURE — 70450 CT HEAD/BRAIN W/O DYE: CPT

## 2020-04-28 PROCEDURE — 71045 X-RAY EXAM CHEST 1 VIEW: CPT

## 2020-04-28 PROCEDURE — 80053 COMPREHEN METABOLIC PANEL: CPT | Performed by: PHYSICIAN ASSISTANT

## 2020-04-28 RX ORDER — NITROGLYCERIN 0.4 MG/1
0.4 TABLET SUBLINGUAL
Status: DISCONTINUED | OUTPATIENT
Start: 2020-04-28 | End: 2020-05-09 | Stop reason: HOSPADM

## 2020-04-28 RX ORDER — AMLODIPINE BESYLATE 10 MG/1
10 TABLET ORAL EVERY MORNING
Status: DISCONTINUED | OUTPATIENT
Start: 2020-04-29 | End: 2020-04-30

## 2020-04-28 RX ORDER — FINASTERIDE 5 MG/1
5 TABLET, FILM COATED ORAL DAILY
Status: DISCONTINUED | OUTPATIENT
Start: 2020-04-28 | End: 2020-04-30

## 2020-04-28 RX ORDER — ASPIRIN 325 MG
325 TABLET ORAL DAILY
Status: DISCONTINUED | OUTPATIENT
Start: 2020-04-28 | End: 2020-04-30

## 2020-04-28 RX ORDER — LANOLIN ALCOHOL/MO/W.PET/CERES
100 CREAM (GRAM) TOPICAL DAILY
Status: DISCONTINUED | OUTPATIENT
Start: 2020-04-28 | End: 2020-04-30

## 2020-04-28 RX ORDER — ONDANSETRON 2 MG/ML
4 INJECTION INTRAMUSCULAR; INTRAVENOUS EVERY 6 HOURS PRN
Status: DISCONTINUED | OUTPATIENT
Start: 2020-04-28 | End: 2020-05-09 | Stop reason: HOSPADM

## 2020-04-28 RX ORDER — ALUMINA, MAGNESIA, AND SIMETHICONE 2400; 2400; 240 MG/30ML; MG/30ML; MG/30ML
15 SUSPENSION ORAL EVERY 6 HOURS PRN
Status: DISCONTINUED | OUTPATIENT
Start: 2020-04-28 | End: 2020-05-09 | Stop reason: HOSPADM

## 2020-04-28 RX ORDER — DEXTROSE MONOHYDRATE 50 MG/ML
150 INJECTION, SOLUTION INTRAVENOUS CONTINUOUS
Status: DISCONTINUED | OUTPATIENT
Start: 2020-04-28 | End: 2020-05-01

## 2020-04-28 RX ORDER — SODIUM CHLORIDE 0.9 % (FLUSH) 0.9 %
10 SYRINGE (ML) INJECTION EVERY 12 HOURS SCHEDULED
Status: DISCONTINUED | OUTPATIENT
Start: 2020-04-28 | End: 2020-05-09 | Stop reason: HOSPADM

## 2020-04-28 RX ORDER — CARBIDOPA AND LEVODOPA 25; 100 MG/1; MG/1
2 TABLET, EXTENDED RELEASE ORAL 4 TIMES DAILY
Status: DISCONTINUED | OUTPATIENT
Start: 2020-04-28 | End: 2020-05-09 | Stop reason: HOSPADM

## 2020-04-28 RX ORDER — CETIRIZINE HYDROCHLORIDE 1 MG/ML
5 SYRUP ORAL DAILY
Status: DISCONTINUED | OUTPATIENT
Start: 2020-04-28 | End: 2020-04-29

## 2020-04-28 RX ORDER — CETIRIZINE HYDROCHLORIDE 10 MG/1
5 TABLET ORAL DAILY
Status: DISCONTINUED | OUTPATIENT
Start: 2020-04-28 | End: 2020-04-28 | Stop reason: CLARIF

## 2020-04-28 RX ORDER — SODIUM CHLORIDE 450 MG/100ML
75 INJECTION, SOLUTION INTRAVENOUS CONTINUOUS
Status: DISCONTINUED | OUTPATIENT
Start: 2020-04-28 | End: 2020-04-28

## 2020-04-28 RX ORDER — DEXTROSE MONOHYDRATE 50 MG/ML
150 INJECTION, SOLUTION INTRAVENOUS CONTINUOUS
Status: DISCONTINUED | OUTPATIENT
Start: 2020-04-28 | End: 2020-04-28

## 2020-04-28 RX ORDER — CHOLECALCIFEROL (VITAMIN D3) 125 MCG
500 CAPSULE ORAL DAILY
Status: DISCONTINUED | OUTPATIENT
Start: 2020-04-28 | End: 2020-04-30

## 2020-04-28 RX ORDER — ACETAMINOPHEN 160 MG/5ML
650 SOLUTION ORAL EVERY 4 HOURS PRN
Status: DISCONTINUED | OUTPATIENT
Start: 2020-04-28 | End: 2020-05-09 | Stop reason: HOSPADM

## 2020-04-28 RX ORDER — RIVASTIGMINE TARTRATE 1.5 MG/1
1.5 CAPSULE ORAL DAILY
Status: DISCONTINUED | OUTPATIENT
Start: 2020-04-28 | End: 2020-04-30

## 2020-04-28 RX ORDER — TAMSULOSIN HYDROCHLORIDE 0.4 MG/1
0.4 CAPSULE ORAL NIGHTLY
Status: DISCONTINUED | OUTPATIENT
Start: 2020-04-28 | End: 2020-04-30

## 2020-04-28 RX ORDER — SODIUM CHLORIDE 0.9 % (FLUSH) 0.9 %
10 SYRINGE (ML) INJECTION AS NEEDED
Status: DISCONTINUED | OUTPATIENT
Start: 2020-04-28 | End: 2020-05-09 | Stop reason: HOSPADM

## 2020-04-28 RX ORDER — LANSOPRAZOLE
30 KIT EVERY MORNING
Status: DISCONTINUED | OUTPATIENT
Start: 2020-04-29 | End: 2020-04-30

## 2020-04-28 RX ORDER — PANTOPRAZOLE SODIUM 40 MG/1
40 TABLET, DELAYED RELEASE ORAL EVERY MORNING
Status: DISCONTINUED | OUTPATIENT
Start: 2020-04-29 | End: 2020-04-28 | Stop reason: CLARIF

## 2020-04-28 RX ADMIN — DEXTROSE MONOHYDRATE 150 ML/HR: 50 INJECTION, SOLUTION INTRAVENOUS at 17:36

## 2020-04-29 PROBLEM — E87.0 HYPERNATREMIA: Status: ACTIVE | Noted: 2020-04-29

## 2020-04-29 LAB
ALBUMIN SERPL-MCNC: 3.2 G/DL (ref 3.5–5.2)
ALBUMIN/GLOB SERPL: 1.1 G/DL
ALP SERPL-CCNC: 101 U/L (ref 39–117)
ALT SERPL W P-5'-P-CCNC: 62 U/L (ref 1–41)
ANION GAP SERPL CALCULATED.3IONS-SCNC: 10 MMOL/L (ref 5–15)
ANION GAP SERPL CALCULATED.3IONS-SCNC: 9.8 MMOL/L (ref 5–15)
AST SERPL-CCNC: 34 U/L (ref 1–40)
BASOPHILS # BLD AUTO: 0.03 10*3/MM3 (ref 0–0.2)
BASOPHILS NFR BLD AUTO: 0.3 % (ref 0–1.5)
BILIRUB SERPL-MCNC: 2 MG/DL (ref 0.2–1.2)
BUN BLD-MCNC: 51 MG/DL (ref 8–23)
BUN BLD-MCNC: 59 MG/DL (ref 8–23)
BUN/CREAT SERPL: 33.8 (ref 7–25)
BUN/CREAT SERPL: 34.7 (ref 7–25)
CALCIUM SPEC-SCNC: 8.3 MG/DL (ref 8.6–10.5)
CALCIUM SPEC-SCNC: 8.8 MG/DL (ref 8.6–10.5)
CHLORIDE SERPL-SCNC: 122 MMOL/L (ref 98–107)
CHLORIDE SERPL-SCNC: 131 MMOL/L (ref 98–107)
CO2 SERPL-SCNC: 23 MMOL/L (ref 22–29)
CO2 SERPL-SCNC: 24.2 MMOL/L (ref 22–29)
CREAT BLD-MCNC: 1.51 MG/DL (ref 0.76–1.27)
CREAT BLD-MCNC: 1.7 MG/DL (ref 0.76–1.27)
DEPRECATED RDW RBC AUTO: 44.4 FL (ref 37–54)
EOSINOPHIL # BLD AUTO: 0.15 10*3/MM3 (ref 0–0.4)
EOSINOPHIL NFR BLD AUTO: 1.3 % (ref 0.3–6.2)
ERYTHROCYTE [DISTWIDTH] IN BLOOD BY AUTOMATED COUNT: 14.4 % (ref 12.3–15.4)
GFR SERPL CREATININE-BSD FRML MDRD: 39 ML/MIN/1.73
GFR SERPL CREATININE-BSD FRML MDRD: 44 ML/MIN/1.73
GLOBULIN UR ELPH-MCNC: 3 GM/DL
GLUCOSE BLD-MCNC: 152 MG/DL (ref 65–99)
GLUCOSE BLD-MCNC: 173 MG/DL (ref 65–99)
GLUCOSE BLDC GLUCOMTR-MCNC: 141 MG/DL (ref 70–130)
HCT VFR BLD AUTO: 44.2 % (ref 37.5–51)
HGB BLD-MCNC: 14.4 G/DL (ref 13–17.7)
IMM GRANULOCYTES # BLD AUTO: 0.06 10*3/MM3 (ref 0–0.05)
IMM GRANULOCYTES NFR BLD AUTO: 0.5 % (ref 0–0.5)
LYMPHOCYTES # BLD AUTO: 1.81 10*3/MM3 (ref 0.7–3.1)
LYMPHOCYTES NFR BLD AUTO: 16.1 % (ref 19.6–45.3)
MAGNESIUM SERPL-MCNC: 2.8 MG/DL (ref 1.6–2.4)
MCH RBC QN AUTO: 28.3 PG (ref 26.6–33)
MCHC RBC AUTO-ENTMCNC: 32.6 G/DL (ref 31.5–35.7)
MCV RBC AUTO: 86.8 FL (ref 79–97)
MONOCYTES # BLD AUTO: 0.6 10*3/MM3 (ref 0.1–0.9)
MONOCYTES NFR BLD AUTO: 5.4 % (ref 5–12)
NEUTROPHILS # BLD AUTO: 8.56 10*3/MM3 (ref 1.7–7)
NEUTROPHILS NFR BLD AUTO: 76.4 % (ref 42.7–76)
NRBC BLD AUTO-RTO: 0 /100 WBC (ref 0–0.2)
PHOSPHATE SERPL-MCNC: 3 MG/DL (ref 2.5–4.5)
PLATELET # BLD AUTO: 158 10*3/MM3 (ref 140–450)
PMV BLD AUTO: 11.4 FL (ref 6–12)
POTASSIUM BLD-SCNC: 3.2 MMOL/L (ref 3.5–5.2)
POTASSIUM BLD-SCNC: 3.4 MMOL/L (ref 3.5–5.2)
PROT SERPL-MCNC: 6.2 G/DL (ref 6–8.5)
RBC # BLD AUTO: 5.09 10*6/MM3 (ref 4.14–5.8)
SODIUM BLD-SCNC: 156 MMOL/L (ref 136–145)
SODIUM BLD-SCNC: 164 MMOL/L (ref 136–145)
WBC NRBC COR # BLD: 11.21 10*3/MM3 (ref 3.4–10.8)

## 2020-04-29 PROCEDURE — 92610 EVALUATE SWALLOWING FUNCTION: CPT

## 2020-04-29 PROCEDURE — 25010000003 POTASSIUM CHLORIDE 10 MEQ/100ML SOLUTION: Performed by: INTERNAL MEDICINE

## 2020-04-29 PROCEDURE — 84100 ASSAY OF PHOSPHORUS: CPT | Performed by: INTERNAL MEDICINE

## 2020-04-29 PROCEDURE — 85025 COMPLETE CBC W/AUTO DIFF WBC: CPT | Performed by: INTERNAL MEDICINE

## 2020-04-29 PROCEDURE — 82962 GLUCOSE BLOOD TEST: CPT

## 2020-04-29 PROCEDURE — 36415 COLL VENOUS BLD VENIPUNCTURE: CPT | Performed by: INTERNAL MEDICINE

## 2020-04-29 PROCEDURE — 83735 ASSAY OF MAGNESIUM: CPT | Performed by: INTERNAL MEDICINE

## 2020-04-29 PROCEDURE — 80053 COMPREHEN METABOLIC PANEL: CPT | Performed by: INTERNAL MEDICINE

## 2020-04-29 RX ORDER — POTASSIUM CHLORIDE 7.45 MG/ML
10 INJECTION INTRAVENOUS
Status: COMPLETED | OUTPATIENT
Start: 2020-04-29 | End: 2020-04-29

## 2020-04-29 RX ADMIN — DEXTROSE MONOHYDRATE 150 ML/HR: 50 INJECTION, SOLUTION INTRAVENOUS at 07:52

## 2020-04-29 RX ADMIN — POTASSIUM CHLORIDE 10 MEQ: 7.46 INJECTION, SOLUTION INTRAVENOUS at 19:03

## 2020-04-29 RX ADMIN — SODIUM CHLORIDE, PRESERVATIVE FREE 10 ML: 5 INJECTION INTRAVENOUS at 09:00

## 2020-04-29 RX ADMIN — DEXTROSE MONOHYDRATE 150 ML/HR: 50 INJECTION, SOLUTION INTRAVENOUS at 21:21

## 2020-04-29 RX ADMIN — DEXTROSE MONOHYDRATE 150 ML/HR: 50 INJECTION, SOLUTION INTRAVENOUS at 14:40

## 2020-04-29 RX ADMIN — POTASSIUM CHLORIDE 10 MEQ: 7.46 INJECTION, SOLUTION INTRAVENOUS at 17:57

## 2020-04-30 LAB
ALBUMIN SERPL-MCNC: 2.9 G/DL (ref 3.5–5.2)
ANION GAP SERPL CALCULATED.3IONS-SCNC: 10.4 MMOL/L (ref 5–15)
BUN BLD-MCNC: 43 MG/DL (ref 8–23)
BUN/CREAT SERPL: 34.7 (ref 7–25)
CALCIUM SPEC-SCNC: 8.5 MG/DL (ref 8.6–10.5)
CHLORIDE SERPL-SCNC: 119 MMOL/L (ref 98–107)
CO2 SERPL-SCNC: 24.6 MMOL/L (ref 22–29)
CREAT BLD-MCNC: 1.24 MG/DL (ref 0.76–1.27)
GFR SERPL CREATININE-BSD FRML MDRD: 56 ML/MIN/1.73
GLUCOSE BLD-MCNC: 147 MG/DL (ref 65–99)
GLUCOSE BLDC GLUCOMTR-MCNC: 125 MG/DL (ref 70–130)
MAGNESIUM SERPL-MCNC: 2.7 MG/DL (ref 1.6–2.4)
PHOSPHATE SERPL-MCNC: 3.4 MG/DL (ref 2.5–4.5)
POTASSIUM BLD-SCNC: 3.7 MMOL/L (ref 3.5–5.2)
SODIUM BLD-SCNC: 154 MMOL/L (ref 136–145)

## 2020-04-30 PROCEDURE — 92526 ORAL FUNCTION THERAPY: CPT

## 2020-04-30 PROCEDURE — 80069 RENAL FUNCTION PANEL: CPT | Performed by: HOSPITALIST

## 2020-04-30 PROCEDURE — 83735 ASSAY OF MAGNESIUM: CPT | Performed by: INTERNAL MEDICINE

## 2020-04-30 PROCEDURE — 82962 GLUCOSE BLOOD TEST: CPT

## 2020-04-30 RX ORDER — FAMOTIDINE 10 MG/ML
20 INJECTION, SOLUTION INTRAVENOUS DAILY
Status: DISCONTINUED | OUTPATIENT
Start: 2020-04-30 | End: 2020-05-08

## 2020-04-30 RX ADMIN — FAMOTIDINE 20 MG: 10 INJECTION INTRAVENOUS at 16:13

## 2020-04-30 RX ADMIN — SODIUM CHLORIDE, PRESERVATIVE FREE 10 ML: 5 INJECTION INTRAVENOUS at 09:28

## 2020-04-30 RX ADMIN — DEXTROSE MONOHYDRATE 150 ML/HR: 50 INJECTION, SOLUTION INTRAVENOUS at 13:59

## 2020-04-30 RX ADMIN — DEXTROSE MONOHYDRATE 150 ML/HR: 50 INJECTION, SOLUTION INTRAVENOUS at 05:29

## 2020-04-30 RX ADMIN — SODIUM CHLORIDE, PRESERVATIVE FREE 10 ML: 5 INJECTION INTRAVENOUS at 16:13

## 2020-05-01 LAB
ALBUMIN SERPL-MCNC: 2.8 G/DL (ref 3.5–5.2)
ANION GAP SERPL CALCULATED.3IONS-SCNC: 10.6 MMOL/L (ref 5–15)
BUN BLD-MCNC: 27 MG/DL (ref 8–23)
BUN/CREAT SERPL: 31.4 (ref 7–25)
CALCIUM SPEC-SCNC: 7.8 MG/DL (ref 8.6–10.5)
CHLORIDE SERPL-SCNC: 113 MMOL/L (ref 98–107)
CO2 SERPL-SCNC: 24.4 MMOL/L (ref 22–29)
CREAT BLD-MCNC: 0.86 MG/DL (ref 0.76–1.27)
GFR SERPL CREATININE-BSD FRML MDRD: 85 ML/MIN/1.73
GLUCOSE BLD-MCNC: 129 MG/DL (ref 65–99)
MAGNESIUM SERPL-MCNC: 2.2 MG/DL (ref 1.6–2.4)
PHOSPHATE SERPL-MCNC: 2.7 MG/DL (ref 2.5–4.5)
POTASSIUM BLD-SCNC: 2.9 MMOL/L (ref 3.5–5.2)
SODIUM BLD-SCNC: 148 MMOL/L (ref 136–145)

## 2020-05-01 PROCEDURE — 83735 ASSAY OF MAGNESIUM: CPT | Performed by: NURSE PRACTITIONER

## 2020-05-01 PROCEDURE — 84132 ASSAY OF SERUM POTASSIUM: CPT | Performed by: HOSPITALIST

## 2020-05-01 PROCEDURE — 25010000003 POTASSIUM CHLORIDE 10 MEQ/100ML SOLUTION: Performed by: HOSPITALIST

## 2020-05-01 PROCEDURE — 80069 RENAL FUNCTION PANEL: CPT | Performed by: INTERNAL MEDICINE

## 2020-05-01 RX ORDER — SODIUM CHLORIDE 9 MG/ML
75 INJECTION, SOLUTION INTRAVENOUS CONTINUOUS
Status: DISCONTINUED | OUTPATIENT
Start: 2020-05-01 | End: 2020-05-01

## 2020-05-01 RX ORDER — DEXTROSE MONOHYDRATE 50 MG/ML
50 INJECTION, SOLUTION INTRAVENOUS CONTINUOUS
Status: ACTIVE | OUTPATIENT
Start: 2020-05-01 | End: 2020-05-05

## 2020-05-01 RX ORDER — POTASSIUM CHLORIDE 7.45 MG/ML
10 INJECTION INTRAVENOUS
Status: DISCONTINUED | OUTPATIENT
Start: 2020-05-01 | End: 2020-05-09 | Stop reason: HOSPADM

## 2020-05-01 RX ADMIN — POTASSIUM CHLORIDE 10 MEQ: 7.46 INJECTION, SOLUTION INTRAVENOUS at 18:48

## 2020-05-01 RX ADMIN — SODIUM CHLORIDE, PRESERVATIVE FREE 10 ML: 5 INJECTION INTRAVENOUS at 09:06

## 2020-05-01 RX ADMIN — POTASSIUM CHLORIDE 10 MEQ: 7.46 INJECTION, SOLUTION INTRAVENOUS at 14:28

## 2020-05-01 RX ADMIN — POTASSIUM CHLORIDE 10 MEQ: 7.46 INJECTION, SOLUTION INTRAVENOUS at 17:45

## 2020-05-01 RX ADMIN — POTASSIUM CHLORIDE 10 MEQ: 7.46 INJECTION, SOLUTION INTRAVENOUS at 15:31

## 2020-05-01 RX ADMIN — SODIUM CHLORIDE, PRESERVATIVE FREE 10 ML: 5 INJECTION INTRAVENOUS at 20:28

## 2020-05-01 RX ADMIN — POTASSIUM CHLORIDE 10 MEQ: 7.46 INJECTION, SOLUTION INTRAVENOUS at 16:44

## 2020-05-01 RX ADMIN — POTASSIUM CHLORIDE 10 MEQ: 7.46 INJECTION, SOLUTION INTRAVENOUS at 13:23

## 2020-05-01 RX ADMIN — SODIUM CHLORIDE 75 ML/HR: 9 INJECTION, SOLUTION INTRAVENOUS at 10:00

## 2020-05-01 RX ADMIN — DEXTROSE MONOHYDRATE 75 ML/HR: 50 INJECTION, SOLUTION INTRAVENOUS at 23:48

## 2020-05-01 RX ADMIN — FAMOTIDINE 20 MG: 10 INJECTION INTRAVENOUS at 09:06

## 2020-05-01 NOTE — PLAN OF CARE
Problem: Patient Care Overview  Goal: Plan of Care Review  Outcome: Ongoing (interventions implemented as appropriate)  Flowsheets (Taken 5/1/2020 1716)  Progress: improving  Plan of Care Reviewed With: patient  Outcome Summary: VSS, pt denies pain, more alert today, stated his name, birthday, yes/no, slept most of the shift, K+ protocol inititated, K+ 2.9 replacement in process, IVF to be changed back to D5 once current NS liter completed per neph, remains NPO, CTM

## 2020-05-01 NOTE — PROGRESS NOTES
"   LOS: 3 days    Patient Care Team:  Hieu Blanco Jr., MD as PCP - General (Family Medicine)    Chief Complaint:    Chief Complaint   Patient presents with   • Abnormal Lab   • Weakness - Generalized   • Altered Mental Status     Follow up JAX and hypernatremia  Subjective     Interval History:   Awake; speech still bit garbled; NPO til speech eval; NS infusing        Objective     Vital Signs  Temp:  [97.4 °F (36.3 °C)-98 °F (36.7 °C)] 97.6 °F (36.4 °C)  Heart Rate:  [51-54] 51  Resp:  [16-18] 16  BP: (133-170)/() 135/71    Flowsheet Rows      First Filed Value   Admission Height  172.7 cm (67.99\") Documented at 04/28/2020 1633   Admission Weight  69.2 kg (152 lb 8.9 oz) Documented at 04/28/2020 1633          I/O this shift:  In: 619 [I.V.:519; IV Piggyback:100]  Out: -   I/O last 3 completed shifts:  In: 4957 [I.V.:4957]  Out: -     Intake/Output Summary (Last 24 hours) at 5/1/2020 1438  Last data filed at 5/1/2020 1428  Gross per 24 hour   Intake 2927 ml   Output --   Net 2927 ml       Physical Exam:  Elderly, awake.  Does say hello.  Nods when asked if thirsty  Oral mucosa very dry; tongue coated with dried secretions  Neck no jvd  Heart RRR no s3 or rub. Bradycardic.   Lungs clear.  No wheezes. No rales .  Abd +bs, soft, nontender, ND  No body wall edema  Ext no edema  Skin dry.  No rash        Results Review:    Results from last 7 days   Lab Units 05/01/20  0426 04/30/20  0429 04/29/20  1611 04/29/20  0602  04/28/20  1322   SODIUM mmol/L 148* 154* 156* 164*   < > 170*   POTASSIUM mmol/L 2.9* 3.7 3.2* 3.4*   < > 3.8   CHLORIDE mmol/L 113* 119* 122* 131*   < > 129*   CO2 mmol/L 24.4 24.6 24.2 23.0   < > 26.8   BUN mg/dL 27* 43* 51* 59*   < > 66*   CREATININE mg/dL 0.86 1.24 1.51* 1.70*   < > 1.94*   CALCIUM mg/dL 7.8* 8.5* 8.3* 8.8   < > 9.9   BILIRUBIN mg/dL  --   --   --  2.0*  --  2.0*   ALK PHOS U/L  --   --   --  101  --  124*   ALT (SGPT) U/L  --   --   --  62*  --  69*   AST (SGOT) U/L  --   --   " --  34  --  48*   GLUCOSE mg/dL 129* 147* 152* 173*   < > 106*    < > = values in this interval not displayed.       Estimated Creatinine Clearance: 67.9 mL/min (by C-G formula based on SCr of 0.86 mg/dL).    Results from last 7 days   Lab Units 05/01/20  0426 04/30/20  0429 04/29/20  0602   MAGNESIUM mg/dL 2.2 2.7* 2.8*   PHOSPHORUS mg/dL 2.7 3.4 3.0             Results from last 7 days   Lab Units 04/29/20  0602 04/28/20  1322   WBC 10*3/mm3 11.21* 11.60*   HEMOGLOBIN g/dL 14.4 16.8   PLATELETS 10*3/mm3 158 180               Imaging Results (Last 24 Hours)     ** No results found for the last 24 hours. **          carbidopa-levodopa ER 2 tablet Oral 4x Daily   famotidine 20 mg Intravenous Daily   sodium chloride 10 mL Intravenous Q12H       sodium chloride 75 mL/hr Last Rate: 75 mL/hr (05/01/20 1000)       Medication Review:   Current Facility-Administered Medications   Medication Dose Route Frequency Provider Last Rate Last Dose   • acetaminophen (TYLENOL) 160 MG/5ML solution 650 mg  650 mg Oral Q4H PRN Marvin Hsu MD       • aluminum-magnesium hydroxide-simethicone (MAALOX MAX) 400-400-40 MG/5ML suspension 15 mL  15 mL Oral Q6H PRN Marvin Hsu MD       • carbidopa-levodopa ER (SINEMET CR)  MG per tablet 2 tablet  2 tablet Oral 4x Daily Marvin Hsu MD   Stopped at 04/28/20 2020   • famotidine (PEPCID) injection 20 mg  20 mg Intravenous Daily Bryson Napoles MD   20 mg at 05/01/20 0906   • nitroglycerin (NITROSTAT) SL tablet 0.4 mg  0.4 mg Sublingual Q5 Min PRN Marvin Hsu MD       • ondansetron (ZOFRAN) injection 4 mg  4 mg Intravenous Q6H PRN Marvin Hsu MD       • potassium chloride 10 mEq in 100 mL IVPB  10 mEq Intravenous Q1H PRN Bryson Napoles  mL/hr at 05/01/20 1428 10 mEq at 05/01/20 1428   • sodium chloride 0.9 % flush 10 mL  10 mL Intravenous Q12H Marvin Hsu MD   10 mL at 05/01/20 0906   • sodium chloride 0.9 % flush 10  mL  10 mL Intravenous Marvin Rizvi MD   10 mL at 04/30/20 1613   • sodium chloride 0.9 % infusion  75 mL/hr Intravenous Continuous Bryson Napoles MD 75 mL/hr at 05/01/20 1000 75 mL/hr at 05/01/20 1000       Assessment/Plan   1. JAX and hypernatremia due to severe volume and water depletion.  Waste products improving. Na slowly improving. Stable K.  2. Parkinson's disease  3. Dysphagia. Not safe for PO intake.    4. Asymptomatic bradycardia.  BP stable.   Meds reviewed .    Plan:  1.  Aggressive K replacement  2.  Transition back to D5W once current IVF bag empty  3.  Await speech stephan Bellamy MD  05/01/20  14:38

## 2020-05-01 NOTE — PROGRESS NOTES
"    DAILY PROGRESS NOTE  [facility]    Patient Identification:  Name: Sp Malagon  Age: 84 y.o.  Sex: male  :  1935  MRN: [id number]         Primary Care Physician: Hieu Blanco Jr., MD    Subjective:  Interval History: History and ROS really not that obtainable from this patient.  He is currently resting quite soundly.  Discussed case with RN and no new issues have arisen    Objective: No family at bedside.  Patient elderly and frail and despite clinical improvement on paper overall no clinical gains really noted    Scheduled Meds:  carbidopa-levodopa ER 2 tablet Oral 4x Daily   famotidine 20 mg Intravenous Daily   sodium chloride 10 mL Intravenous Q12H     Continuous Infusions:  sodium chloride 75 mL/hr Last Rate: 75 mL/hr (20 1000)       Vital signs in last 24 hours:  Temp:  [97.4 °F (36.3 °C)-98.4 °F (36.9 °C)] 97.5 °F (36.4 °C)  Heart Rate:  [51-55] 51  Resp:  [16-18] 18  BP: (122-170)/() 148/75    Intake/Output:    Intake/Output Summary (Last 24 hours) at 2020 1258  Last data filed at 2020 1000  Gross per 24 hour   Intake 3226 ml   Output --   Net 3226 ml       Exam:  /75   Pulse 51   Temp 97.5 °F (36.4 °C) (Oral)   Resp 18   Ht 172.7 cm (67.99\")   Wt 72.5 kg (159 lb 14.4 oz)   SpO2 98%   BMI 24.32 kg/m²     General Appearance:    Alerts, cooperative, no distress                        Throat:   Oral mucosa pink and moist                           Neck:   Supple, symmetrical, trachea midline, no JVD                         Lungs:    Decreased bases otherwise clear to auscultation bilaterally, respirations unlabored                         Heart:    Regular rate and rhythm, S1 and S2 normal                  Abdomen:     Soft, nontender, bowel sounds active                 Extremities:   No cyanosis or edema                           Data Review:  Labs in chart were reviewed.    Assessment:  Active Hospital Problems    Diagnosis  POA   • **Hypernatremia " [E87.0]  Unknown   • Acute renal failure (ARF) (CMS/HCC) [N17.9]  Yes   • BPH (benign prostatic hyperplasia) [N40.0]  Yes   • Parkinson disease (CMS/HCC) [G20]  Yes   • Hypertension [I10]  Yes   • Degeneration of lumbar intervertebral disc [M51.36]  Yes      Resolved Hospital Problems   No resolved problems to display.       Plan:    Acute abnormalities are now resolved the patient is clinically no better    K replacement ordered through IV since still n.p.o. we will check mag with a.m. Labs    I switched IV fluids over to normal saline at 75 cc an hour as hypernatremia is resolved and patient just needs some baseline hydration.  Thoroughly appreciate nephrology assistance    Long-term prognosis very poor.  Discussed with RN    Awaiting speech to allow p.o. safety before we can entertain thoughts of disposition with plans to go back to [facility] Gm Napoles MD  5/1/2020  12:58

## 2020-05-01 NOTE — PROGRESS NOTES
Continued Stay Note  [facility]     Patient Name: Sp Malagon  MRN: [id number]  Today's Date: 5/1/2020    Admit Date: 4/28/2020    Discharge Plan     Row Name 05/01/20 1400       Plan    Plan Comments  Phoned pt's spouse per protocol for a verbal acknowledgement of Medicare rights/letter; IMM acknowledged by Radha/spouse [phone]. While on the phone w/ spouse/Radha I confirmed the dc plan to return to [facility] . Radha is agreeable w/ pt returning to [facility] if he is d/c'd over the weekend, but she requested a referral to [facility] and [facility]. Ramya/Judie notified and will follow up w/ spouse. If  pt is d/c'd over the weekend and there aren't any beds @ [facility] or [facility] will follow up w/ spouse for potential transfer to [facility] from [facility].  Transfer packet and Ambulance form w/ paper chart. Nsg will notify spouse if d/c is over the weekend; CCP will follow up Monday. I will also ask the weekend CCP to follow up # [phone].         Discharge Codes    No documentation.             Krista Carnes RN

## 2020-05-01 NOTE — DISCHARGE PLACEMENT REQUEST
"Sp Malagon (84 y.o. Male)     Date of Birth Social Security Number Address Home Phone MRN    1935  [address] [place] [address] [phone] [id number]    Hindu Marital Status          None        Admission Date Admission Type Admitting Provider Attending Provider Department, Room/Bed    4/28/20 Emergency Marvin Hsu MD Masden, Troy Andrew, MD [address], [address]/1    Discharge Date Discharge Disposition Discharge Destination                       Attending Provider:  Bryson Napoles MD    Allergies:  No Known Allergies    Isolation:  None   Infection:  None   Code Status:  CPR    Ht:  172.7 cm (67.99\")   Wt:  72.5 kg (159 lb 14.4 oz)    Admission Cmt:  None   Principal Problem:  Hypernatremia [E87.0]                 Active Insurance as of 4/28/2020     Primary Coverage     Payor Plan Insurance Group Employer/Plan Group    MEDICARE MEDICARE A & B      Payor Plan Address Payor Plan Phone Number Payor Plan Fax Number Effective Dates    [address] [phone]  1/1/1994 - None Entered    [facility] [id number]       Subscriber Name Subscriber Birth Date Member ID       SP MALAGON 1935 [id number]           Secondary Coverage     Payor Plan Insurance Group Employer/Plan Group    [facility] [employer] OPTIONS      Payor Plan Address Payor Plan Phone Number Payor Plan Fax Number Effective Dates    [facility] [phone]  1/1/2016 - None Entered    [address]       [facility] [id number]       Subscriber Name Subscriber Birth Date Member ID       SP MALAGON 1935 [id number]                 Emergency Contacts      (Rel.) Home Phone Work Phone Mobile Phone    ManasDenniseRadha (Spouse) [phone] -- --    Dianne Hudson (Daughter) [phone] -- --    Shanita Pimentel (Daughter) -- -- [phone]              "

## 2020-05-01 NOTE — PROGRESS NOTES
"Adult Nutrition  Assessment/PES    Patient Name:  Sp Malagon  YOB: 1935  MRN: [id number]  Admit Date:  4/28/2020    Assessment Date:  5/1/2020    Comments:  Nutrition follow up.  Patient remains NPO.  S/p SLP re-evaluation yesterday.  More awake.  At times able to communicate a few words.  Returning to [facility] once medically stable.    Patient would benefit from addition of oral nutrition supplement once diet is advanced.    If diet unable to be advanced, may need to consider alternate means of nutrition.    Due to the COVID pandemic, nutrition assessment completed based on review of electronic medical record. This RD currently working remotely and can be reached at [phone], via secure chat or email.     RD will continue to follow for plans for nutrition.    Reason for Assessment     Row Name 05/01/20 1122          Reason for Assessment    Reason For Assessment  follow-up protocol         Nutrition/Diet History     Row Name 05/01/20 1122          Nutrition/Diet History    Typical Food/Fluid Intake  continues NPO, s/p SLP re-eval yesterday         Anthropometrics     Row Name 05/01/20 1122          Anthropometrics    Height  172.7 cm (67.99\")        Admit Weight    Admit Weight  -- 159# 4/29        Ideal Body Weight (IBW)    Ideal Body Weight (IBW) (kg)  70.87        Body Mass Index (BMI)    BMI Assessment  BMI 18.5-24.9: normal 24.25         Labs/Tests/Procedures/Meds     Row Name 05/01/20 1122          Labs/Procedures/Meds    Lab Results Reviewed  reviewed, pertinent     Lab Results Comments  Gluc, Na, K, BUN, Alb, ALT        Diagnostic Tests/Procedures    Diagnostic Test/Procedure Reviewed  reviewed, pertinent     Diagnostic Test/Procedures Comments  s/p SLP re-evaluation yesterday        Medications    Pertinent Medications Reviewed  reviewed, pertinent     Pertinent Medications Comments  sinemet, pepcid, IVFs         Physical Findings     Row Name 05/01/20 1123          Physical " "Findings    Skin  pressure injury;other (see comments) st II L buttocks and coccyx, R heel DTI, bruised, B=10         Estimated/Assessed Needs     Row Name 05/01/20 1122          Calculation Measurements    Height  172.7 cm (67.99\")         Nutrition Prescription Ordered     Row Name 05/01/20 1124          Nutrition Prescription PO    Current PO Diet  NPO         Problem/Interventions:    Intervention Goal     Row Name 05/01/20 1124          Intervention Goal    General  Maintain nutrition;Reduce/improve symptoms;Disease management/therapy;Meet nutritional needs for age/condition     PO  Initiate feeding     Weight  Maintain weight         Nutrition Intervention     Row Name 05/01/20 1124          Nutrition Intervention    RD/Tech Action  Follow Tx progress;Care plan reviewd;Await begin PO         Education/Evaluation     Row Name 05/01/20 1124          Education    Education  Will Instruct as appropriate        Monitor/Evaluation    Monitor  Per protocol;I&O;Pertinent labs;Weight;Skin status;Symptoms     Education Follow-up  Reinforce PRN           Electronically signed by:  Marielle España RD  05/01/20 11:25  "

## 2020-05-01 NOTE — PLAN OF CARE
Problem: Patient Care Overview  Goal: Plan of Care Review  Outcome: Ongoing (interventions implemented as appropriate)  Flowsheets (Taken 5/1/2020 0513)  Progress: no change  Plan of Care Reviewed With: patient  Outcome Summary: Patient VSS. SB on monitor. Speech still garbled but at times able to communicate a few words appropriately. IV fluids continued. Lab work pending for this morning. Assisted with turns q2h. No acute distress. Will continue to monitor.     Problem: Renal Failure/Kidney Injury, Acute (Adult)  Goal: Signs and Symptoms of Listed Potential Problems Will be Absent, Minimized or Managed (Renal Failure/Kidney Injury, Acute)  Outcome: Ongoing (interventions implemented as appropriate)  Flowsheets (Taken 5/1/2020 0513)  Problems Assessed (Acute Renal Failure/Kidney Injury): all  Problems Present (ARF/Kidney Injury): situational response     Problem: Fall Risk (Adult)  Goal: Absence of Fall  Outcome: Ongoing (interventions implemented as appropriate)  Flowsheets (Taken 5/1/2020 0513)  Absence of Fall: making progress toward outcome     Problem: Wound (Includes Pressure Injury) (Adult)  Goal: Signs and Symptoms of Listed Potential Problems Will be Absent, Minimized or Managed (Wound)  Outcome: Ongoing (interventions implemented as appropriate)  Flowsheets (Taken 5/1/2020 0513)  Problems Assessed (Wound): all  Problems Present (Wound): delayed wound healing     Problem: Skin Injury Risk (Adult)  Goal: Skin Health and Integrity  Outcome: Ongoing (interventions implemented as appropriate)  Flowsheets (Taken 5/1/2020 0513)  Skin Health and Integrity: making progress toward outcome     Problem: Nutrition, Imbalanced: Inadequate Oral Intake (Adult)  Goal: Improved Oral Intake  Outcome: Ongoing (interventions implemented as appropriate)  Flowsheets (Taken 5/1/2020 0513)  Improved Oral Intake: making progress toward outcome  Goal: Prevent Further Weight Loss  Outcome: Ongoing (interventions implemented as  appropriate)  Flowsheets (Taken 5/1/2020 0559)  Prevent Further Weight Loss: making progress toward outcome

## 2020-05-01 NOTE — PROGRESS NOTES
Continued Stay Note  [facility]     Patient Name: Sp Malagon  MRN: [id number]  Today's Date: 5/1/2020    Admit Date: 4/28/2020    Discharge Plan     Row Name 05/01/20 1053       Plan    Plan  Return to [facility] via [facility]     Plan Comments  Plan remains to return to [facility] once medically stable. Pt to travel to rehab via [facility]; transfer packet w/ pt's paper chart.         Discharge Codes    No documentation.             Krista Carnes RN

## 2020-05-02 LAB
MAGNESIUM SERPL-MCNC: 2.2 MG/DL (ref 1.6–2.4)
POTASSIUM BLD-SCNC: 3.6 MMOL/L (ref 3.5–5.2)

## 2020-05-02 PROCEDURE — 83735 ASSAY OF MAGNESIUM: CPT | Performed by: HOSPITALIST

## 2020-05-02 RX ADMIN — FAMOTIDINE 20 MG: 10 INJECTION INTRAVENOUS at 09:41

## 2020-05-02 RX ADMIN — SODIUM CHLORIDE, PRESERVATIVE FREE 10 ML: 5 INJECTION INTRAVENOUS at 20:13

## 2020-05-02 RX ADMIN — CARBIDOPA AND LEVODOPA 2 TABLET: 25; 100 TABLET, EXTENDED RELEASE ORAL at 20:14

## 2020-05-02 RX ADMIN — SODIUM CHLORIDE, PRESERVATIVE FREE 10 ML: 5 INJECTION INTRAVENOUS at 09:41

## 2020-05-02 RX ADMIN — DEXTROSE MONOHYDRATE 75 ML/HR: 50 INJECTION, SOLUTION INTRAVENOUS at 15:30

## 2020-05-02 NOTE — PROGRESS NOTES
[facility]     LOS: 4 days     Name: Sp Malagon  Age: 84 y.o.  Sex: male  :  1935  MRN: [id number]         Primary Care Physician: Hieu Blanco Jr., MD    NPO Diet    Subjective   Lying in bed.  In no acute distress.  Seems to be moaning more than speaking.  Apparently not new. Patient is new to me.    Review of Systems   Unable to perform ROS: Other   Mental status    Objective   Temp:  [97.4 °F (36.3 °C)-98.2 °F (36.8 °C)] 98.2 °F (36.8 °C)  Heart Rate:  [51-56] 56  Resp:  [16-18] 18  BP: (133-161)/(64-84) 161/84  SpO2:  [97 %-98 %] 97 %  on   ;   Device (Oxygen Therapy): room air  Body mass index is 24.32 kg/m².    Physical Exam   Constitutional: No distress.   Cardiovascular: Normal rate and regular rhythm.   Pulmonary/Chest: Effort normal. No respiratory distress. He has decreased breath sounds. He has no wheezes. He has no rhonchi.   Abdominal: Soft. Bowel sounds are normal. There is no tenderness. There is no rebound and no guarding.   Musculoskeletal: He exhibits no edema.   Neurological: He is alert.   Seems to be moving all extremities.  Seems to be moaning rather than speaking.   Skin: Skin is warm and dry.   Nursing note and vitals reviewed.    Reviewed medications and new clinical results    Scheduled Meds  carbidopa-levodopa ER 2 tablet Oral 4x Daily   famotidine 20 mg Intravenous Daily   sodium chloride 10 mL Intravenous Q12H     Continuous Infusions  dextrose 75 mL/hr Last Rate: 75 mL/hr (20 1898)     PRN Meds  •  acetaminophen  •  aluminum-magnesium hydroxide-simethicone  •  nitroglycerin  •  ondansetron  •  potassium chloride  •  sodium chloride    Results from last 7 days   Lab Units 20  0602 20  1322   WBC 10*3/mm3 11.21* 11.60*   HEMOGLOBIN g/dL 14.4 16.8   PLATELETS 10*3/mm3 158 180     Results from last 7 days   Lab Units 20  2350 20  0426 20  0429 20  1611 20  0602  04/28/20  2137 04/28/20  1322   SODIUM mmol/L  --  148* 154* 156* 164* 167* 170*   POTASSIUM mmol/L 3.6 2.9* 3.7 3.2* 3.4* 3.5 3.8   CHLORIDE mmol/L  --  113* 119* 122* 131* 131* 129*   CO2 mmol/L  --  24.4 24.6 24.2 23.0 23.3 26.8   BUN mg/dL  --  27* 43* 51* 59* 64* 66*   CREATININE mg/dL  --  0.86 1.24 1.51* 1.70* 1.82* 1.94*   CALCIUM mg/dL  --  7.8* 8.5* 8.3* 8.8 9.0 9.9   GLUCOSE mg/dL  --  129* 147* 152* 173* 260* 106*     Lab Results   Component Value Date    ANIONGAP 10.6 05/01/2020     Glucose   Date/Time Value Ref Range Status   04/30/2020 0528 125 70 - 130 mg/dL Final   04/29/2020 2011 141 (H) 70 - 130 mg/dL Final     Estimated Creatinine Clearance: 67.9 mL/min (by C-G formula based on SCr of 0.86 mg/dL).    I personally reviewed CT, CXR    Assessment/Plan   Active Hospital Problems    Diagnosis  POA   • **Hypernatremia [E87.0]  Unknown   • Acute renal failure (ARF) (CMS/HCC) [N17.9]  Yes   • BPH (benign prostatic hyperplasia) [N40.0]  Yes   • Parkinson disease (CMS/HCC) [G20]  Yes   • Hypertension [I10]  Yes   • Degeneration of lumbar intervertebral disc [M51.36]  Yes      Resolved Hospital Problems   No resolved problems to display.     84 y.o. male nursing home resident with a history of Parkinson's dementia admitted with lethargy, decreased responsiveness, decreased intake and severe hypernatremia    · Severe hypernatremia improving and acute renal failure resolved  · Hypokalemia-replaced  · Speech therapy to see again today  · Disposition to be determined.  PT evaluation, SLP evaluation  · Discussed with nursing staff.    Brian Chanel MD   05/02/20  10:59

## 2020-05-02 NOTE — PROGRESS NOTES
"   LOS: 4 days    Patient Care Team:  Hieu Blanco Jr., MD as PCP - General (Family Medicine)    Chief Complaint:    Chief Complaint   Patient presents with   • Abnormal Lab   • Weakness - Generalized   • Altered Mental Status     Follow up JAX and hypernatremia  Subjective     Interval History:   Awake; looks uncomfortable; speech bit garbled; NPO til speech eval; D5W infusing      Objective     Vital Signs  Temp:  [97.4 °F (36.3 °C)-98.4 °F (36.9 °C)] 98.4 °F (36.9 °C)  Heart Rate:  [51-56] 56  Resp:  [16-18] 18  BP: (133-169)/(64-95) 169/95    Flowsheet Rows      First Filed Value   Admission Height  172.7 cm (67.99\") Documented at 04/28/2020 1633   Admission Weight  69.2 kg (152 lb 8.9 oz) Documented at 04/28/2020 1633          No intake/output data recorded.  I/O last 3 completed shifts:  In: 3307 [I.V.:2807; IV Piggyback:500]  Out: -     Intake/Output Summary (Last 24 hours) at 5/2/2020 1738  Last data filed at 5/1/2020 1849  Gross per 24 hour   Intake 861 ml   Output --   Net 861 ml       Physical Exam:  Elderly, awake.  Communicative.  Nods when asked if thirsty  Oral mucosa very dry; tongue coated with dried secretions  Neck no jvd  Heart RRR no s3 or rub. Bradycardic.   Lungs clear.  No wheezes. No rales.  Not labored on room air  Abd +bs, soft, nontender, ND  No body wall edema  Ext no edema  Skin dry.  No rash        Results Review:    Results from last 7 days   Lab Units 05/01/20  2350 05/01/20  0426 04/30/20  0429 04/29/20  1611 04/29/20  0602  04/28/20  1322   SODIUM mmol/L  --  148* 154* 156* 164*   < > 170*   POTASSIUM mmol/L 3.6 2.9* 3.7 3.2* 3.4*   < > 3.8   CHLORIDE mmol/L  --  113* 119* 122* 131*   < > 129*   CO2 mmol/L  --  24.4 24.6 24.2 23.0   < > 26.8   BUN mg/dL  --  27* 43* 51* 59*   < > 66*   CREATININE mg/dL  --  0.86 1.24 1.51* 1.70*   < > 1.94*   CALCIUM mg/dL  --  7.8* 8.5* 8.3* 8.8   < > 9.9   BILIRUBIN mg/dL  --   --   --   --  2.0*  --  2.0*   ALK PHOS U/L  --   --   --   --  101  " --  124*   ALT (SGPT) U/L  --   --   --   --  62*  --  69*   AST (SGOT) U/L  --   --   --   --  34  --  48*   GLUCOSE mg/dL  --  129* 147* 152* 173*   < > 106*    < > = values in this interval not displayed.       Estimated Creatinine Clearance: 67.9 mL/min (by C-G formula based on SCr of 0.86 mg/dL).    Results from last 7 days   Lab Units 05/02/20  0420 05/01/20  0426 04/30/20  0429 04/29/20  0602   MAGNESIUM mg/dL 2.2 2.2 2.7* 2.8*   PHOSPHORUS mg/dL  --  2.7 3.4 3.0             Results from last 7 days   Lab Units 04/29/20  0602 04/28/20  1322   WBC 10*3/mm3 11.21* 11.60*   HEMOGLOBIN g/dL 14.4 16.8   PLATELETS 10*3/mm3 158 180               Imaging Results (Last 24 Hours)     ** No results found for the last 24 hours. **          carbidopa-levodopa ER 2 tablet Oral 4x Daily   famotidine 20 mg Intravenous Daily   sodium chloride 10 mL Intravenous Q12H       dextrose 75 mL/hr Last Rate: 75 mL/hr (05/02/20 1530)       Medication Review:   Current Facility-Administered Medications   Medication Dose Route Frequency Provider Last Rate Last Dose   • acetaminophen (TYLENOL) 160 MG/5ML solution 650 mg  650 mg Oral Q4H PRN Marvin Hsu MD       • aluminum-magnesium hydroxide-simethicone (MAALOX MAX) 400-400-40 MG/5ML suspension 15 mL  15 mL Oral Q6H PRN Marvin Hsu MD       • carbidopa-levodopa ER (SINEMET CR)  MG per tablet 2 tablet  2 tablet Oral 4x Daily Marvin Hsu MD   Stopped at 04/28/20 2020   • dextrose (D5W) 5 % infusion  75 mL/hr Intravenous Continuous Johnsno Bellamy MD 75 mL/hr at 05/02/20 1530 75 mL/hr at 05/02/20 1530   • famotidine (PEPCID) injection 20 mg  20 mg Intravenous Daily Bryson Napoles MD   20 mg at 05/02/20 0941   • nitroglycerin (NITROSTAT) SL tablet 0.4 mg  0.4 mg Sublingual Q5 Min PRN Marvin Hsu MD       • ondansetron (ZOFRAN) injection 4 mg  4 mg Intravenous Q6H PRN Marvin Hsu MD       • potassium chloride 10 mEq in 100  mL IVPB  10 mEq Intravenous Q1H PRN Bryson Napoles  mL/hr at 05/01/20 1848 10 mEq at 05/01/20 1848   • sodium chloride 0.9 % flush 10 mL  10 mL Intravenous Q12H Marvin Hsu MD   10 mL at 05/02/20 0941   • sodium chloride 0.9 % flush 10 mL  10 mL Intravenous PRN Marvin Hsu MD   10 mL at 04/30/20 1613       Assessment/Plan   1. JAX and hypernatremia due to severe volume and water depletion.  Waste products improving. Na slowly improving. Stable K after replacement  2. Parkinson's disease  3. Dysphagia. Not safe for PO intake.    4. Asymptomatic bradycardia.  BP stable.   Meds reviewed .    Plan:  1.  D5W, but will increase rate  2.  Potassium replacement as needed  3.  Await speech stephan Bellamy MD  05/02/20  17:38

## 2020-05-02 NOTE — PLAN OF CARE
Alert and saying some words but disoriented x 3. Complete care. NPO. Awaiting swallow re-eval. Telemetry SR/SB. Wife updated per phone.  Problem: Patient Care Overview  Goal: Plan of Care Review  Outcome: Ongoing (interventions implemented as appropriate)  Goal: Individualization and Mutuality  Outcome: Ongoing (interventions implemented as appropriate)  Goal: Discharge Needs Assessment  Outcome: Ongoing (interventions implemented as appropriate)  Goal: Interprofessional Rounds/Family Conf  Outcome: Ongoing (interventions implemented as appropriate)     Problem: Patient Care Overview  Goal: Plan of Care Review  Outcome: Ongoing (interventions implemented as appropriate)  Goal: Individualization and Mutuality  Outcome: Ongoing (interventions implemented as appropriate)  Goal: Discharge Needs Assessment  Outcome: Ongoing (interventions implemented as appropriate)  Goal: Interprofessional Rounds/Family Conf  Outcome: Ongoing (interventions implemented as appropriate)     Problem: Renal Failure/Kidney Injury, Acute (Adult)  Goal: Signs and Symptoms of Listed Potential Problems Will be Absent, Minimized or Managed (Renal Failure/Kidney Injury, Acute)  Outcome: Ongoing (interventions implemented as appropriate)     Problem: Fall Risk (Adult)  Goal: Absence of Fall  Outcome: Ongoing (interventions implemented as appropriate)     Problem: Wound (Includes Pressure Injury) (Adult)  Goal: Signs and Symptoms of Listed Potential Problems Will be Absent, Minimized or Managed (Wound)  Outcome: Ongoing (interventions implemented as appropriate)     Problem: Skin Injury Risk (Adult)  Goal: Skin Health and Integrity  Outcome: Ongoing (interventions implemented as appropriate)     Problem: Nutrition, Imbalanced: Inadequate Oral Intake (Adult)  Goal: Identify Related Risk Factors and Signs and Symptoms  Outcome: Ongoing (interventions implemented as appropriate)  Goal: Improved Oral Intake  Outcome: Ongoing (interventions implemented  as appropriate)  Goal: Prevent Further Weight Loss  Outcome: Ongoing (interventions implemented as appropriate)

## 2020-05-02 NOTE — PLAN OF CARE
Patient is resting well this shift and tolerating his IV fluids as ordered. He received a new site this shift as IV was pulled out on accident. Lungs are clear without congestion or cough noted. Abdomen is soft with hypoactive bowel sounds and no s/s of nausea or vomiting. No edema to extremeties and all pulses palpable, and no skin discoloration. There is a small area of abrasion to his bilateral buttocks and right heel, nursing is treating and applying dressings to promote healing. Oral care is done for this patient every two hours if not more frequently due to his very dry mouth and oral cavity. Patient has also been seen mouth breathing often.

## 2020-05-03 LAB
ALBUMIN SERPL-MCNC: 2.7 G/DL (ref 3.5–5.2)
ANION GAP SERPL CALCULATED.3IONS-SCNC: 10.7 MMOL/L (ref 5–15)
BUN BLD-MCNC: 13 MG/DL (ref 8–23)
BUN/CREAT SERPL: 17.3 (ref 7–25)
CALCIUM SPEC-SCNC: 8.3 MG/DL (ref 8.6–10.5)
CHLORIDE SERPL-SCNC: 109 MMOL/L (ref 98–107)
CO2 SERPL-SCNC: 22.3 MMOL/L (ref 22–29)
CREAT BLD-MCNC: 0.75 MG/DL (ref 0.76–1.27)
GFR SERPL CREATININE-BSD FRML MDRD: 100 ML/MIN/1.73
GLUCOSE BLD-MCNC: 117 MG/DL (ref 65–99)
PHOSPHATE SERPL-MCNC: 2.1 MG/DL (ref 2.5–4.5)
POTASSIUM BLD-SCNC: 3.7 MMOL/L (ref 3.5–5.2)
SODIUM BLD-SCNC: 142 MMOL/L (ref 136–145)

## 2020-05-03 PROCEDURE — 80069 RENAL FUNCTION PANEL: CPT | Performed by: INTERNAL MEDICINE

## 2020-05-03 PROCEDURE — 97162 PT EVAL MOD COMPLEX 30 MIN: CPT | Performed by: PHYSICAL THERAPIST

## 2020-05-03 PROCEDURE — 97110 THERAPEUTIC EXERCISES: CPT | Performed by: PHYSICAL THERAPIST

## 2020-05-03 RX ADMIN — CARBIDOPA AND LEVODOPA 2 TABLET: 25; 100 TABLET, EXTENDED RELEASE ORAL at 21:05

## 2020-05-03 RX ADMIN — POTASSIUM PHOSPHATE, MONOBASIC AND POTASSIUM PHOSPHATE, DIBASIC 10 MMOL: 224; 236 INJECTION, SOLUTION INTRAVENOUS at 17:36

## 2020-05-03 RX ADMIN — CARBIDOPA AND LEVODOPA 2 TABLET: 25; 100 TABLET, EXTENDED RELEASE ORAL at 09:52

## 2020-05-03 RX ADMIN — DEXTROSE MONOHYDRATE 50 ML/HR: 50 INJECTION, SOLUTION INTRAVENOUS at 21:02

## 2020-05-03 RX ADMIN — SODIUM CHLORIDE, PRESERVATIVE FREE 10 ML: 5 INJECTION INTRAVENOUS at 09:06

## 2020-05-03 RX ADMIN — CARBIDOPA AND LEVODOPA 2 TABLET: 25; 100 TABLET, EXTENDED RELEASE ORAL at 17:43

## 2020-05-03 RX ADMIN — FAMOTIDINE 20 MG: 10 INJECTION INTRAVENOUS at 09:06

## 2020-05-03 NOTE — PROGRESS NOTES
[facility]     LOS: 5 days     Name: Sp Malagon  Age: 84 y.o.  Sex: male  :  1935  MRN: [id number]         Primary Care Physician: Hieu Blanco Jr., MD    NPO Diet    Subjective    No moaning today. Discussed with nursing staff he is more alert today and was able to communicated earlier and ask for water. Discussed with wife at length goals of care. Greater than 50% of time spent in counseling and/or coordination of care. Total face/floor time 35 minutes.     Review of Systems   Unable to perform ROS: Other     Objective   Temp:  [97.6 °F (36.4 °C)-98.4 °F (36.9 °C)] 97.6 °F (36.4 °C)  Heart Rate:  [53-56] 55  Resp:  [18] 18  BP: (153-169)/(87-98) 153/98  SpO2:  [96 %-99 %] 99 %  on   ;   Device (Oxygen Therapy): room air  Body mass index is 24.32 kg/m².    Physical Exam   Constitutional: No distress.   Cardiovascular: Normal rate and regular rhythm.   Pulmonary/Chest: Effort normal. No respiratory distress. He has decreased breath sounds. He has no wheezes. He has no rhonchi.   Abdominal: Soft. Bowel sounds are normal. There is no tenderness. There is no rebound and no guarding.   Musculoskeletal: He exhibits no edema.   Neurological: He is alert.   Skin: Skin is warm and dry.   Nursing note and vitals reviewed.    Reviewed medications and new clinical results    Scheduled Meds    carbidopa-levodopa ER 2 tablet Oral 4x Daily   famotidine 20 mg Intravenous Daily   sodium chloride 10 mL Intravenous Q12H     Continuous Infusions    dextrose 150 mL/hr Last Rate: 150 mL/hr (20 1425)     PRN Meds  •  acetaminophen  •  aluminum-magnesium hydroxide-simethicone  •  nitroglycerin  •  ondansetron  •  potassium chloride  •  sodium chloride    Results from last 7 days   Lab Units 20  0602 20  1322   WBC 10*3/mm3 11.21* 11.60*   HEMOGLOBIN g/dL 14.4 16.8   PLATELETS 10*3/mm3 158 180     Results from last 7 days   Lab Units 20  7415  05/01/20  2350 05/01/20  0426 04/30/20  0429 04/29/20  1611 04/29/20  0602 04/28/20  2137 04/28/20  1322   SODIUM mmol/L 142  --  148* 154* 156* 164* 167* 170*   POTASSIUM mmol/L 3.7 3.6 2.9* 3.7 3.2* 3.4* 3.5 3.8   CHLORIDE mmol/L 109*  --  113* 119* 122* 131* 131* 129*   CO2 mmol/L 22.3  --  24.4 24.6 24.2 23.0 23.3 26.8   BUN mg/dL 13  --  27* 43* 51* 59* 64* 66*   CREATININE mg/dL 0.75*  --  0.86 1.24 1.51* 1.70* 1.82* 1.94*   CALCIUM mg/dL 8.3*  --  7.8* 8.5* 8.3* 8.8 9.0 9.9   GLUCOSE mg/dL 117*  --  129* 147* 152* 173* 260* 106*     Lab Results   Component Value Date    ANIONGAP 10.7 05/03/2020     Estimated Creatinine Clearance: 73 mL/min (A) (by C-G formula based on SCr of 0.75 mg/dL (L)).    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     84 y.o. male nursing home resident with a history of Parkinson's dementia admitted with lethargy, decreased responsiveness, decreased intake and severe hypernatremia    · Severe hypernatremia and acute renal failure resolved  · Hypokalemia-replaced  · Speech therapy to see again for safety of PO intake  · Disposition back to [facility] soon  · Discussed with patient's wife goals of care. Patient has been in SNF since end of March, previously at home and according to wife he was eating okay but seem to go downhill since being placed in skilled nursing facility and she has not been able to visit  · Discussed with nursing staff.    Brian Chanel MD   05/03/20  12:12

## 2020-05-03 NOTE — THERAPY TREATMENT NOTE
Patient Name: Sp Malagon  : 1935    MRN: [id number]                              Today's Date: 5/3/2020       Admit Date: 2020    Visit Dx:     ICD-10-CM ICD-9-CM   1. Acute renal failure, unspecified acute renal failure type (CMS/HCC) N17.9 584.9   2. Hypernatremia E87.0 276.0     Patient Active Problem List   Diagnosis   • Degeneration of lumbar intervertebral disc   • Parkinson disease (CMS/HCC)   • Hypertension   • Chronic constipation   • Lumbar spinal stenosis   • BPH (benign prostatic hyperplasia)   • RICHARDSON (dyspnea on exertion)   • Onychomycosis   • Dysarthria   • Ataxia   • Acute renal failure (ARF) (CMS/HCC)   • Hypernatremia     Past Medical History:   Diagnosis Date   • Abnormal EKG     PT UNSURE    • Allergic rhinitis    • Alzheimer disease (CMS/HCC)    • Anarthria    • Arthritis    • Ataxia    • Back pain    • BPH (benign prostatic hyperplasia)    • Dysarthria    • GERD (gastroesophageal reflux disease)    • Hypertension    • Left anterior fascicular block    • Lumbar spinal stenosis    • Numbness and tingling     LEFT LEG AND LEFT FOOT   • Parkinson disease (CMS/HCC)     [facility]   • Sacral back pain    • Tremor      Past Surgical History:   Procedure Laterality Date   • BACK SURGERY     • CATARACT EXTRACTION Bilateral    • CERVICAL SPINE ANTERIOR     • EYE SURGERY Left     cataract   • HIP ARTHROPLASTY Right    • LAMINECTOMY  2004   • LUMBAR DISCECTOMY FUSION INSTRUMENTATION N/A 10/3/2016    Procedure: L3-L5 HARDWARE REMOVAL. L5-S1 EXPIRATION OF FUSION;  Surgeon: Tristen Baez MD;  Location: [facility] OR;  Service:    • LUMBAR FUSION     • LUMBAR LAMINECTOMY DISCECTOMY DECOMPRESSION N/A 10/3/2016    Procedure: L5-S1 LUMBAR LAMINECTOMY DISCECTOMY DECOMPRESSION POSTERIOR ;  Surgeon: Rajesh Kenney MD;  Location: [facility] OR;  Service:      General Information     Row Name 20 1138          PT Evaluation Time/Intention    Document Type   evaluation  -     Mode of Treatment  individual therapy;physical therapy  -     Row Name 05/03/20 1138          General Information    Prior Level of Function  -- per notes, from facility and uses w/c for mobility  -[facility] Name 05/03/20 1138          Relationship/Environment    Lives With  facility resident  -     Row Name 05/03/20 1138          Resource/Environmental Concerns    Current Living Arrangements  extended care facility  -[facility] Name 05/03/20 1138          Cognitive Assessment/Intervention- PT/OT    Orientation Status (Cognition)  unable/difficult to assess pt mumbling throughout session, unable to undestand pt  -[facility] Name 05/03/20 1138          Safety Issues, Functional Mobility    Impairments Affecting Function (Mobility)  balance;endurance/activity tolerance;postural/trunk control;strength;cognition  -       User Key  (r) = Recorded By, (t) = Taken By, (c) = Cosigned By    Initials Name Provider Type    Concha Grimaldo, PT Physical Therapist        Mobility     [place] [facility] Name 05/03/20 1139          Bed Mobility Assessment/Treatment    Bed Mobility Assessment/Treatment  supine-sit;sit-supine  -     Supine-Sit [place] (Bed Mobility)  dependent (less than 25% patient effort)  -     Sit-Supine [place] (Bed Mobility)  dependent (less than 25% patient effort)  -     Comment (Bed Mobility)  Pt not participating in attempt to sit EOB, leaning back, trying to lay down  -       User Key  (r) = Recorded By, (t) = Taken By, (c) = Cosigned By    Initials Name Provider Type    Concha Grimaldo, PT Physical Therapist        Obj/Interventions     [place] [facility] Name 05/03/20 1140          General ROM    GENERAL ROM COMMENTS  AAROM WFL  -[facility] Name 05/03/20 1140          MMT (Manual Muscle Testing)    General MMT Comments  difficult to assess, limited voluntary movements, resists against ROM at times  -     Row Name 05/03/20 1140          Therapeutic Exercise     Comment (Therapeutic Exercise)  BLE ROM x 10 reps AAROM, PROM  -     Row Name 05/03/20 1140          Static Sitting Balance    Level of [place] (Unsupported Sitting, Static Balance)  dependent, less than 25% patient effort  -       User Key  (r) = Recorded By, (t) = Taken By, (c) = Cosigned By    Initials Name Provider Type    Concha Grimaldo, PT Physical Therapist        Goals/Plan     Row Name 05/03/20 1147          Bed Mobility Goal 1 (PT)    Activity/Assistive Device (Bed Mobility Goal 1, PT)  bed mobility activities, all  -KH     [place] Level/Cues Needed (Bed Mobility Goal 1, PT)  moderate assist (50-74% patient effort)  -     Time Frame (Bed Mobility Goal 1, PT)  1 week  -     Row Name 05/03/20 1147          Transfer Goal 1 (PT)    Activity/Assistive Device (Transfer Goal 1, PT)  bed-to-chair/chair-to-bed  -KH     [place] Level/Cues Needed (Transfer Goal 1, PT)  moderate assist (50-74% patient effort)  -     Time Frame (Transfer Goal 1, PT)  1 week  -[facility] Name 05/03/20 1147          Patient Education Goal (PT)    Activity (Patient Education Goal, PT)  HEP  -KH     [place]/Cues/Accuracy (Memory Goal 2, PT)  demonstrates adequately  -KH     Time Frame (Patient Education Goal, PT)  1 week  -       User Key  (r) = Recorded By, (t) = Taken By, (c) = Cosigned By    Initials Name Provider Type    Concha Grimaldo, PT Physical Therapist        Clinical Impression     Row Name 05/03/20 1141          Pain Assessment    Additional Documentation  Pain Scale: FACES Pre/Post-Treatment (Group)  -[facility] Name 05/03/20 1141          Pain Scale: FACES Pre/Post-Treatment    Pain: FACES Scale, Pretreatment  0-->no hurt  -     Pain: FACES Scale, Post-Treatment  0-->no hurt  -[facility] Name 05/03/20 1141          Plan of Care Review    Plan of Care Reviewed With  patient  -     Outcome Summary  Pt admitted from facilty. Per RN, he is more alert today and trying to  talk, but difficult to understand. He was not following commands, but did participate with Doug at times. Attempted to transfer to sitting EOB, but pt kept trying to lay back down. Recent prior level of function is unclear and pt is unable to discuss this. Pt could benefit from PT to address strengthening and mobility, but rehab progress is limited by congnitive status at this time. Pt will follow 3x/week and progress as tolerated.   -     Row Name 05/03/20 1141          Physical Therapy Clinical Impression    Patient/Family Goals Statement (PT Clinical Impression)  pt unable to say, returning to facility per chart  -     Criteria for Skilled Interventions Met (PT Clinical Impression)  treatment indicated  -CHIO     Rehab Potential (PT Clinical Summary)  fair, will monitor progress closely  -     Row Name 05/03/20 1141          Positioning and Restraints    Pre-Treatment Position  in bed  -KH     Post Treatment Position  bed  -KH     In Bed  fowlers;exit alarm on;encouraged to call for assist;call light within reach;notified nsg;waffle boots/both;RUE elevated;LUE elevated  -       User Key  (r) = Recorded By, (t) = Taken By, (c) = Cosigned By    Initials Name Provider Type    Concha Grimaldo, PT Physical Therapist        Outcome Measures     Row Name 05/03/20 1143          How much help from another person do you currently need...    Turning from your back to your side while in flat bed without using bedrails?  2  -KH     Moving from lying on back to sitting on the side of a flat bed without bedrails?  1  -KH     Moving to and from a bed to a chair (including a wheelchair)?  1  -KH     Standing up from a chair using your arms (e.g., wheelchair, bedside chair)?  1  -KH     Climbing 3-5 steps with a railing?  1  -KH     To walk in hospital room?  1  -KH     AM-PAC 6 Clicks Score (PT)  7  -     Row Name 05/03/20 1142          Functional Assessment    Outcome Measure Options  AM-PAC 6 Clicks  Basic Mobility (PT)  -       User Key  (r) = Recorded By, (t) = Taken By, (c) = Cosigned By    Initials Name Provider Type    Concha Grimaldo PT Physical Therapist          PT Recommendation and Plan  Planned Therapy Interventions (PT Eval): balance training, bed mobility training, home exercise program, patient/family education, strengthening, transfer training  Outcome Summary/Treatment Plan (PT)  Anticipated Discharge Disposition (PT): extended care facility  Plan of Care Reviewed With: patient  Outcome Summary: Pt admitted from facilty. Per RN, he is more alert today and trying to talk, but difficult to understand. He was not following commands, but did participate with AAROM of BLE at times. Attempted to transfer to sitting EOB, but pt kept trying to lay back down. Recent prior level of function is unclear and pt is unable to discuss this. Pt could benefit from PT to address strengthening and mobility, but rehab progress is limited by congnitive status at this time. Pt will follow 3x/week and progress as tolerated.      Time Calculation:   PT Charges     Row Name 05/03/20 1137             Time Calculation    Start Time  1040  -      Stop Time  1056  -      Time Calculation (min)  16 min  -      PT Received On  05/03/20  -      PT - Next Appointment  05/04/20  -      PT Goal Re-Cert Due Date  05/10/20  -         Time Calculation- PT    Total Timed Code Minutes- PT  8 minute(s)  -        User Key  (r) = Recorded By, (t) = Taken By, (c) = Cosigned By    Initials Name Provider Type    Concha Grimaldo PT Physical Therapist        Therapy Charges for Today     Code Description Service Date Service Provider Modifiers Qty    [id number]  PT EVAL MOD COMPLEXITY 2 5/3/2020 Concha Street, PT GP 1    03615281917 HC PT THER PROC EA 15 MIN 5/3/2020 Concha Street, PT GP 1          PT G-Codes  Outcome Measure Options: AM-PAC 6 Clicks Basic Mobility (PT)  AM-PAC 6  Clicks Score (PT): 7    Concha Street, PT  5/3/2020

## 2020-05-03 NOTE — PLAN OF CARE
"More alert today. Pt's conversation clear at times and at times garbled. Pt asked several times \"what time is it?\" Pt able to swallow Parkinson's med crushed in applesauce for am and pm dose easily without cough. Not able to get pt awake for noon dose. Spouse updated. Telemetry SR.  Problem: Patient Care Overview  Goal: Plan of Care Review  Outcome: Ongoing (interventions implemented as appropriate)  Goal: Individualization and Mutuality  Outcome: Ongoing (interventions implemented as appropriate)  Goal: Discharge Needs Assessment  Outcome: Ongoing (interventions implemented as appropriate)  Goal: Interprofessional Rounds/Family Conf  Outcome: Ongoing (interventions implemented as appropriate)     Problem: Patient Care Overview  Goal: Plan of Care Review  Outcome: Ongoing (interventions implemented as appropriate)  Goal: Individualization and Mutuality  Outcome: Ongoing (interventions implemented as appropriate)  Goal: Discharge Needs Assessment  Outcome: Ongoing (interventions implemented as appropriate)  Goal: Interprofessional Rounds/Family Conf  Outcome: Ongoing (interventions implemented as appropriate)     Problem: Renal Failure/Kidney Injury, Acute (Adult)  Goal: Signs and Symptoms of Listed Potential Problems Will be Absent, Minimized or Managed (Renal Failure/Kidney Injury, Acute)  Outcome: Ongoing (interventions implemented as appropriate)     Problem: Fall Risk (Adult)  Goal: Absence of Fall  Outcome: Ongoing (interventions implemented as appropriate)     Problem: Wound (Includes Pressure Injury) (Adult)  Goal: Signs and Symptoms of Listed Potential Problems Will be Absent, Minimized or Managed (Wound)  Outcome: Ongoing (interventions implemented as appropriate)     Problem: Skin Injury Risk (Adult)  Goal: Skin Health and Integrity  Outcome: Ongoing (interventions implemented as appropriate)     Problem: Nutrition, Imbalanced: Inadequate Oral Intake (Adult)  Goal: Identify Related Risk Factors and Signs " and Symptoms  Outcome: Ongoing (interventions implemented as appropriate)  Goal: Improved Oral Intake  Outcome: Ongoing (interventions implemented as appropriate)  Goal: Prevent Further Weight Loss  Outcome: Ongoing (interventions implemented as appropriate)

## 2020-05-03 NOTE — PLAN OF CARE
Problem: Patient Care Overview  Goal: Plan of Care Review  Flowsheets (Taken 5/3/2020 1141)  Outcome Summary: Pt admitted from facilty. Per RN, he is more alert today and trying to talk, but difficult to understand. He was not following commands, but did participate with AAROM of BLE at times. Attempted to transfer to sitting EOB, but pt kept trying to lay back down. Recent prior level of function is unclear and pt is unable to discuss this. Pt could benefit from PT to address strengthening and mobility, but rehab progress is limited by congnitive status at this time. Pt will follow 3x/week and progress as tolerated.   Patient was wearing a face mask during this therapy encounter. Therapist used appropriate personal protective equipment including mask and gloves.  Mask used was standard procedure mask. Appropriate PPE was worn during the entire therapy session. Hand hygiene was completed before and after therapy session. Patient is not in enhanced droplet precautions.

## 2020-05-04 LAB
ALBUMIN SERPL-MCNC: 2.7 G/DL (ref 3.5–5.2)
ANION GAP SERPL CALCULATED.3IONS-SCNC: 13.8 MMOL/L (ref 5–15)
BUN BLD-MCNC: 10 MG/DL (ref 8–23)
BUN/CREAT SERPL: 12.5 (ref 7–25)
CALCIUM SPEC-SCNC: 8.3 MG/DL (ref 8.6–10.5)
CHLORIDE SERPL-SCNC: 106 MMOL/L (ref 98–107)
CO2 SERPL-SCNC: 21.2 MMOL/L (ref 22–29)
CREAT BLD-MCNC: 0.8 MG/DL (ref 0.76–1.27)
GFR SERPL CREATININE-BSD FRML MDRD: 93 ML/MIN/1.73
GLUCOSE BLD-MCNC: 99 MG/DL (ref 65–99)
PHOSPHATE SERPL-MCNC: 2.6 MG/DL (ref 2.5–4.5)
POTASSIUM BLD-SCNC: 3.6 MMOL/L (ref 3.5–5.2)
SODIUM BLD-SCNC: 141 MMOL/L (ref 136–145)

## 2020-05-04 PROCEDURE — 80069 RENAL FUNCTION PANEL: CPT | Performed by: INTERNAL MEDICINE

## 2020-05-04 PROCEDURE — 92526 ORAL FUNCTION THERAPY: CPT

## 2020-05-04 RX ADMIN — FAMOTIDINE 20 MG: 10 INJECTION INTRAVENOUS at 10:24

## 2020-05-04 RX ADMIN — CARBIDOPA AND LEVODOPA 2 TABLET: 25; 100 TABLET, EXTENDED RELEASE ORAL at 21:55

## 2020-05-04 RX ADMIN — SODIUM CHLORIDE, PRESERVATIVE FREE 10 ML: 5 INJECTION INTRAVENOUS at 08:23

## 2020-05-04 RX ADMIN — CARBIDOPA AND LEVODOPA 2 TABLET: 25; 100 TABLET, EXTENDED RELEASE ORAL at 11:51

## 2020-05-04 RX ADMIN — POTASSIUM PHOSPHATE, MONOBASIC AND POTASSIUM PHOSPHATE, DIBASIC 10 MMOL: 224; 236 INJECTION, SOLUTION INTRAVENOUS at 16:35

## 2020-05-04 RX ADMIN — CARBIDOPA AND LEVODOPA 2 TABLET: 25; 100 TABLET, EXTENDED RELEASE ORAL at 17:25

## 2020-05-04 RX ADMIN — DEXTROSE MONOHYDRATE 50 ML/HR: 50 INJECTION, SOLUTION INTRAVENOUS at 15:13

## 2020-05-04 RX ADMIN — CARBIDOPA AND LEVODOPA 2 TABLET: 25; 100 TABLET, EXTENDED RELEASE ORAL at 08:23

## 2020-05-04 NOTE — PROGRESS NOTES
"   LOS: 6 days    Patient Care Team:  Hieu Blanco Jr., MD as PCP - General (Family Medicine)    Chief Complaint:    Chief Complaint   Patient presents with   • Abnormal Lab   • Weakness - Generalized   • Altered Mental Status     Follow up JAX and hypernatremia  Subjective     Interval History:   Sleepy and difficult to arouse; D5W infusing      Objective     Vital Signs  Temp:  [97 °F (36.1 °C)-98 °F (36.7 °C)] 97.8 °F (36.6 °C)  Heart Rate:  [60-78] 60  Resp:  [16-20] 16  BP: (105-140)/(61-79) 135/72    Flowsheet Rows      First Filed Value   Admission Height  172.7 cm (67.99\") Documented at 04/28/2020 1633   Admission Weight  69.2 kg (152 lb 8.9 oz) Documented at 04/28/2020 1633          No intake/output data recorded.  I/O last 3 completed shifts:  In: 1900 [I.V.:1900]  Out: -     Intake/Output Summary (Last 24 hours) at 5/4/2020 1436  Last data filed at 5/3/2020 2102  Gross per 24 hour   Intake 900 ml   Output --   Net 900 ml       Physical Exam:  Elderly, somnolent; NAD  Oral mucosa dry  Neck no jvd  Heart RR no s3 or rub. Bradycardic.   Lungs clear.  No wheezes. No rales.  Not labored on room air  Abd +bs, soft, nontender, ND  No body wall edema  Ext no edema  Skin dry.  No rash        Results Review:    Results from last 7 days   Lab Units 05/04/20  0443 05/03/20  0428 05/01/20  2350 05/01/20  0426  04/29/20  0602  04/28/20  1322   SODIUM mmol/L 141 142  --  148*   < > 164*   < > 170*   POTASSIUM mmol/L 3.6 3.7 3.6 2.9*   < > 3.4*   < > 3.8   CHLORIDE mmol/L 106 109*  --  113*   < > 131*   < > 129*   CO2 mmol/L 21.2* 22.3  --  24.4   < > 23.0   < > 26.8   BUN mg/dL 10 13  --  27*   < > 59*   < > 66*   CREATININE mg/dL 0.80 0.75*  --  0.86   < > 1.70*   < > 1.94*   CALCIUM mg/dL 8.3* 8.3*  --  7.8*   < > 8.8   < > 9.9   BILIRUBIN mg/dL  --   --   --   --   --  2.0*  --  2.0*   ALK PHOS U/L  --   --   --   --   --  101  --  124*   ALT (SGPT) U/L  --   --   --   --   --  62*  --  69*   AST (SGOT) U/L  --   --  "  --   --   --  34  --  48*   GLUCOSE mg/dL 99 117*  --  129*   < > 173*   < > 106*    < > = values in this interval not displayed.       Estimated Creatinine Clearance: 73 mL/min (by C-G formula based on SCr of 0.8 mg/dL).    Results from last 7 days   Lab Units 05/04/20  0443 05/03/20  0428 05/02/20  0420 05/01/20  0426 04/30/20  0429   MAGNESIUM mg/dL  --   --  2.2 2.2 2.7*   PHOSPHORUS mg/dL 2.6 2.1*  --  2.7 3.4             Results from last 7 days   Lab Units 04/29/20  0602 04/28/20  1322   WBC 10*3/mm3 11.21* 11.60*   HEMOGLOBIN g/dL 14.4 16.8   PLATELETS 10*3/mm3 158 180               Imaging Results (Last 24 Hours)     ** No results found for the last 24 hours. **          carbidopa-levodopa ER 2 tablet Oral 4x Daily   famotidine 20 mg Intravenous Daily   sodium chloride 10 mL Intravenous Q12H       dextrose 50 mL/hr Last Rate: 50 mL/hr (05/03/20 2102)       Medication Review:   Current Facility-Administered Medications   Medication Dose Route Frequency Provider Last Rate Last Dose   • acetaminophen (TYLENOL) 160 MG/5ML solution 650 mg  650 mg Oral Q4H PRN Marvin Hsu MD       • aluminum-magnesium hydroxide-simethicone (MAALOX MAX) 400-400-40 MG/5ML suspension 15 mL  15 mL Oral Q6H PRN Marvin Hsu MD       • carbidopa-levodopa ER (SINEMET CR)  MG per tablet 2 tablet  2 tablet Oral 4x Daily Marvin Hsu MD   2 tablet at 05/04/20 1151   • dextrose (D5W) 5 % infusion  50 mL/hr Intravenous Continuous Johnson Bellamy MD 50 mL/hr at 05/03/20 2102 50 mL/hr at 05/03/20 2102   • famotidine (PEPCID) injection 20 mg  20 mg Intravenous Daily Bryson Napoles MD   20 mg at 05/04/20 1024   • nitroglycerin (NITROSTAT) SL tablet 0.4 mg  0.4 mg Sublingual Q5 Min PRN Marvin Hsu MD       • ondansetron (ZOFRAN) injection 4 mg  4 mg Intravenous Q6H PRN Marvin Hsu MD       • potassium chloride 10 mEq in 100 mL IVPB  10 mEq Intravenous Q1H PRN Bryson Napoles  MD Gm 100 mL/hr at 05/01/20 1848 10 mEq at 05/01/20 1848   • sodium chloride 0.9 % flush 10 mL  10 mL Intravenous Q12H Marvin Hsu MD   10 mL at 05/04/20 0823   • sodium chloride 0.9 % flush 10 mL  10 mL Intravenous PRN Marvin Hsu MD   10 mL at 04/30/20 1613       Assessment/Plan   1. JAX and hypernatremia due to severe volume and water depletion, resolved.   Low-normal potassium and phosphorus  2. Parkinson's disease  3. Dysphagia. Not safe for PO intake.    4. Asymptomatic bradycardia.  BP stable.   Meds reviewed .    Plan:  1.  Continue D5W for now, but will taper off tomorrow AM  2.  Potassium phosphate IV  3.  Will sign off, but please call if any questions      Johnson Bellamy MD  05/04/20  14:36

## 2020-05-04 NOTE — PLAN OF CARE
Problem: Skin Injury Risk (Adult)  Intervention: Promote/Optimize Nutrition  Flowsheets (Taken 5/4/2020 1134)  Oral Nutrition Promotion: calorie dense liquids provided;calorie dense foods provided; adding Mighty Shakes daily and Magic Cups BID

## 2020-05-04 NOTE — PLAN OF CARE
Problem: Patient Care Overview  Goal: Plan of Care Review  Outcome: Ongoing (interventions implemented as appropriate)  Flowsheets (Taken 5/4/2020 1642)  Outcome Summary: Patient alert to self. Disoriented. Garbled speech. Speech therapy placed patient on diet. No s/s of acute distress. On iv fluids. Turn every 2 hrs. Vital signs stable. Will continue to monitor.  Goal: Individualization and Mutuality  Outcome: Ongoing (interventions implemented as appropriate)  Goal: Discharge Needs Assessment  Outcome: Ongoing (interventions implemented as appropriate)  Goal: Interprofessional Rounds/Family Conf  Outcome: Ongoing (interventions implemented as appropriate)     Problem: Renal Failure/Kidney Injury, Acute (Adult)  Goal: Signs and Symptoms of Listed Potential Problems Will be Absent, Minimized or Managed (Renal Failure/Kidney Injury, Acute)  Outcome: Ongoing (interventions implemented as appropriate)  Flowsheets (Taken 5/1/2020 0513 by Yamile Sorensen, RN)  Problems Assessed (Acute Renal Failure/Kidney Injury): all  Problems Present (ARF/Kidney Injury): situational response     Problem: Fall Risk (Adult)  Goal: Absence of Fall  Outcome: Ongoing (interventions implemented as appropriate)  Flowsheets (Taken 5/2/2020 0439 by Elizabeth Casper, RN)  Absence of Fall: making progress toward outcome     Problem: Wound (Includes Pressure Injury) (Adult)  Goal: Signs and Symptoms of Listed Potential Problems Will be Absent, Minimized or Managed (Wound)  Outcome: Ongoing (interventions implemented as appropriate)  Flowsheets (Taken 5/1/2020 0513 by Yamile Sorensen, RN)  Problems Assessed (Wound): all  Problems Present (Wound): delayed wound healing     Problem: Skin Injury Risk (Adult)  Goal: Skin Health and Integrity  Outcome: Ongoing (interventions implemented as appropriate)  Flowsheets (Taken 5/4/2020 1642)  Skin Health and Integrity: making progress toward outcome     Problem: Nutrition, Imbalanced: Inadequate Oral Intake  (Adult)  Goal: Identify Related Risk Factors and Signs and Symptoms  Outcome: Ongoing (interventions implemented as appropriate)  Flowsheets  Taken 5/4/2020 1642 by Marcos Galeano RN  Related Risk Factors (Nutrition Imbalance, Inadequate Oral Intake): eating difficulty  Taken 5/2/2020 0439 by Elizabeth Casper, RN  Signs and Symptoms (Nutrition Imbalance, Inadequate Oral Intake: Signs and Symptoms): weakness/lethargy;weight decreased (percent weight loss, percent usual body weight, body mass index less than 18.5) (Adults)  Goal: Improved Oral Intake  Outcome: Ongoing (interventions implemented as appropriate)  Flowsheets (Taken 5/4/2020 1642)  Improved Oral Intake: making progress toward outcome  Goal: Prevent Further Weight Loss  Outcome: Ongoing (interventions implemented as appropriate)

## 2020-05-04 NOTE — PROGRESS NOTES
"DAILY PROGRESS NOTE  [facility]    Patient Identification:  Name: Sp Malagon  Age: 84 y.o.  Sex: male  :  1935  MRN: [id number]         Primary Care Physician: Hieu Blanco Jr., MD    Subjective:  Interval History:He feels better. Got diet.    Objective:    Scheduled Meds:  carbidopa-levodopa ER 2 tablet Oral 4x Daily   famotidine 20 mg Intravenous Daily   sodium chloride 10 mL Intravenous Q12H     Continuous Infusions:  dextrose 50 mL/hr Last Rate: 50 mL/hr (20)       Vital signs in last 24 hours:  Temp:  [97 °F (36.1 °C)-98 °F (36.7 °C)] 97 °F (36.1 °C)  Heart Rate:  [66-78] 66  Resp:  [16-20] 16  BP: (105-146)/(61-89) 140/79    Intake/Output:    Intake/Output Summary (Last 24 hours) at 2020 1155  Last data filed at 5/3/2020 2102  Gross per 24 hour   Intake 900 ml   Output --   Net 900 ml       Exam:  /79   Pulse 66   Temp 97 °F (36.1 °C) (Oral)   Resp 16   Ht 172.7 cm (67.99\")   Wt 72.5 kg (159 lb 14.4 oz)   SpO2 99%   BMI 24.32 kg/m²     General Appearance:    Alert, cooperative, no distress   Head:    Normocephalic, without obvious abnormality, atraumatic   Eyes:       Throat:   Lips, tongue, gums normal   Neck:   Supple, symmetrical, trachea midline, no JVD   Lungs:     Clear to auscultation bilaterally, respirations unlabored   Chest Wall:    No tenderness or deformity    Heart:    Regular rate and rhythm, S1 and S2 normal, no murmur,no  Rub or gallop   Abdomen:     Soft, non-tender, bowel sounds active, no masses, no organomegaly    Extremities:   Extremities normal, atraumatic, no cyanosis or edema   Pulses:      Skin:   Skin is warm and dry,  no rashes or palpable lesions   Neurologic:   parkinsons      Lab Results (last 72 hours)     Procedure Component Value Units Date/Time    Renal Function Panel [[id number]]  (Abnormal) Collected:  20 0443    Specimen:  Blood Updated:  20 06     Glucose 99 mg/dL      BUN 10 mg/dL      Creatinine 0.80 " mg/dL      Sodium 141 mmol/L      Potassium 3.6 mmol/L      Chloride 106 mmol/L      CO2 21.2 mmol/L      Calcium 8.3 mg/dL      Albumin 2.70 g/dL      Phosphorus 2.6 mg/dL      Anion Gap 13.8 mmol/L      BUN/Creatinine Ratio 12.5     eGFR Non African Amer 93 mL/min/1.73     Narrative:       GFR Normal >60  Chronic Kidney Disease <60  Kidney Failure <15      Renal Function Panel [[id number]]  (Abnormal) Collected:  05/03/20 0428    Specimen:  Blood Updated:  05/03/20 0703     Glucose 117 mg/dL      BUN 13 mg/dL      Creatinine 0.75 mg/dL      Sodium 142 mmol/L      Potassium 3.7 mmol/L      Chloride 109 mmol/L      CO2 22.3 mmol/L      Calcium 8.3 mg/dL      Albumin 2.70 g/dL      Phosphorus 2.1 mg/dL      Anion Gap 10.7 mmol/L      BUN/Creatinine Ratio 17.3     eGFR Non African Amer 100 mL/min/1.73     Narrative:       GFR Normal >60  Chronic Kidney Disease <60  Kidney Failure <15      Magnesium [[id number]]  (Normal) Collected:  05/02/20 0420    Specimen:  Blood Updated:  05/02/20 0517     Magnesium 2.2 mg/dL     Potassium [[id number]]  (Normal) Collected:  05/01/20 2350    Specimen:  Blood Updated:  05/02/20 0051     Potassium 3.6 mmol/L         Data Review:  Results from last 7 days   Lab Units 05/04/20  0443 05/03/20  0428 05/01/20 2350 05/01/20 0426   SODIUM mmol/L 141 142  --  148*   POTASSIUM mmol/L 3.6 3.7 3.6 2.9*   CHLORIDE mmol/L 106 109*  --  113*   CO2 mmol/L 21.2* 22.3  --  24.4   BUN mg/dL 10 13  --  27*   CREATININE mg/dL 0.80 0.75*  --  0.86   GLUCOSE mg/dL 99 117*  --  129*   CALCIUM mg/dL 8.3* 8.3*  --  7.8*     Results from last 7 days   Lab Units 04/29/20  0602 04/28/20  1322   WBC 10*3/mm3 11.21* 11.60*   HEMOGLOBIN g/dL 14.4 16.8   HEMATOCRIT % 44.2 49.2   PLATELETS 10*3/mm3 158 180             Lab Results   Lab Value Date/Time    TROPONINT 0.016 04/28/2020 1322    TROPONINT <0.010 03/27/2020 1449    TROPONINT <0.010 02/13/2017 1005         Results from last 7 days   Lab Units 04/29/20  0602  04/28/20  1322   ALK PHOS U/L 101 124*   BILIRUBIN mg/dL 2.0* 2.0*   ALT (SGPT) U/L 62* 69*   AST (SGOT) U/L 34 48*             No results found for: POCGLU        Past Medical History:   Diagnosis Date   • Abnormal EKG     PT UNSURE    • Allergic rhinitis    • Alzheimer disease (CMS/HCC)    • Anarthria    • Arthritis    • Ataxia    • Back pain    • BPH (benign prostatic hyperplasia)    • Dysarthria    • GERD (gastroesophageal reflux disease)    • Hypertension    • Left anterior fascicular block    • Lumbar spinal stenosis    • Numbness and tingling     LEFT LEG AND LEFT FOOT   • Parkinson disease (CMS/HCC)     [facility]   • Sacral back pain    • Tremor        Assessment:  Active Hospital Problems    Diagnosis  POA   • **Hypernatremia [E87.0]  Unknown   • Acute renal failure (ARF) (CMS/HCC) [N17.9]  Yes   • BPH (benign prostatic hyperplasia) [N40.0]  Yes   • Parkinson disease (CMS/HCC) [G20]  Yes   • Hypertension [I10]  Yes   • Degeneration of lumbar intervertebral disc [M51.36]  Yes      Resolved Hospital Problems   No resolved problems to display.       Plan:  As per renal. See how he does with diet. Follow lab.    Harjinder Vergara MD  5/4/2020  11:55

## 2020-05-04 NOTE — PLAN OF CARE
Problem: Patient Care Overview  Goal: Plan of Care Review  Flowsheets (Taken 5/4/2020 5238)  Plan of Care Reviewed With: patient  Outcome Summary: Swallow re-evaluation completed. Rec: pureed diet with nectar thick liquids. Meds crushed in puree. Ice chips 30 minutes after meals following oral care. Upright for PO intake, small bites/sips, straws ok. Assist with meals. SLP to follow for diet tolerance, monitor need for VFSS.

## 2020-05-04 NOTE — PROGRESS NOTES
"Adult Nutrition  Assessment/PES    Patient Name:  Sp Malagon  YOB: 1935  MRN: [id number]  Admit Date:  4/28/2020    Assessment Date:  5/4/2020    Comments:  Nutrition follow up.  S/p SLP re-evaluation this am - advanced to pureed diet with NTL.  More alert per chart review.    Adding oral nutrition supplements - Mighty Shakes daily at breakfast and Magic Cups BID at lunch and dinner.  Increased kcal and protein needs r/t weight loss reported by NH staff at time of initial assessment.    Due to the COVID pandemic, nutrition assessment completed based on review of electronic medical record. This RD currently working remotely and can be reached at [phone], via secure chat or email.     RD will follow for PO intake.    Reason for Assessment     Row Name 05/04/20 1126          Reason for Assessment    Reason For Assessment  follow-up protocol         Nutrition/Diet History     Row Name 05/04/20 1126          Nutrition/Diet History    Typical Food/Fluid Intake  s/p SLP re-eval this am - adv to pureed diet with NTL     Supplemental Drinks/Foods/Additives  adding Mighty Shakes daily and Magic Cups BID         Anthropometrics     Row Name 05/04/20 1127          Anthropometrics    Height  172.7 cm (67.99\")        Admit Weight    Admit Weight  -- 159# 4/29        Ideal Body Weight (IBW)    Ideal Body Weight (IBW) (kg)  70.87        Body Mass Index (BMI)    BMI Assessment  BMI 18.5-24.9: normal 24.25         Labs/Tests/Procedures/Meds     Row Name 05/04/20 1127          Labs/Procedures/Meds    Lab Results Reviewed  reviewed, pertinent     Lab Results Comments  Ca, Alb        Diagnostic Tests/Procedures    Diagnostic Test/Procedure Reviewed  reviewed, pertinent     Diagnostic Test/Procedures Comments  s/p SLP re-eval this am        Medications    Pertinent Medications Reviewed  reviewed, pertinent     Pertinent Medications Comments  sinemet, pepcid, D5W         Physical Findings     Row Name 05/04/20 " "1128          Physical Findings    Skin  pressure injury;other (see comments) st II L buttocks/coccyx, R heel DTI, B=11         Estimated/Assessed Needs     Row Name 05/04/20 1127          Calculation Measurements    Height  172.7 cm (67.99\")         Nutrition Prescription Ordered     Row Name 05/04/20 1129          Nutrition Prescription PO    Current PO Diet  Dysphagia     Dysphagia Level  2  Pureed     Fluid Consistency  Nectar/syrup thick     Common Modifiers  Renal         Evaluation of Received Nutrient/Fluid Intake     Row Name 05/04/20 1129          PO Evaluation    Number of Days PO Intake Evaluated  Insufficient Data         Problem/Interventions:    Problem 3     Row Name 05/04/20 1129          Nutrition Diagnoses Problem 3    Problem 3  Predicted Suboptimal Intake     Etiology (related to)  Factors Affecting Nutrition     Appetite  Poor     Signs/Symptoms (evidenced by)  Report/Observation     Reported/Observed By  Family;RN per wife and NH staff via telephone           Intervention Goal     Row Name 05/04/20 1130          Intervention Goal    General  Maintain nutrition;Reduce/improve symptoms;Disease management/therapy;Meet nutritional needs for age/condition     PO  Establish PO;Tolerate PO;PO intake (%)     PO Intake %  75 %     Weight  Maintain weight         Nutrition Intervention     Row Name 05/04/20 1131          Nutrition Intervention    RD/Tech Action  Follow Tx progress;Care plan reviewd;Recommend/ordered     Recommended/Ordered  Supplement         Nutrition Prescription     Row Name 05/04/20 1131          Nutrition Prescription PO    PO Prescription  Begin/change supplement     Supplement  Mighty Shake;Magic Cup     Supplement Frequency  3 times a day     New PO Prescription Ordered?  Yes         Education/Evaluation     Row Name 05/04/20 1131          Education    Education  Will Instruct as appropriate        Monitor/Evaluation    Monitor  Per protocol;PO intake;Supplement intake;Pertinent " labs;Weight;Skin status;Symptoms     Education Follow-up  Reinforce PRN           Electronically signed by:  Marielle España RD  05/04/20 11:32

## 2020-05-04 NOTE — PLAN OF CARE
Problem: Patient Care Overview  Goal: Plan of Care Review  Outcome: Ongoing (interventions implemented as appropriate)  Flowsheets (Taken 5/4/2020 6927)  Progress: no change  Plan of Care Reviewed With: patient  Note:   VSS;Pt was alert, but unable clearly understand; IVF continue at 50cc/hr; Potassium phosphate was given x1 IV; Waffle boots on and pt turned; Mepilex to coccyx and heel remain C/D/I; No complaints noted; Will continue to monitor     Problem: Renal Failure/Kidney Injury, Acute (Adult)  Goal: Signs and Symptoms of Listed Potential Problems Will be Absent, Minimized or Managed (Renal Failure/Kidney Injury, Acute)  Outcome: Ongoing (interventions implemented as appropriate)     Problem: Fall Risk (Adult)  Goal: Absence of Fall  Outcome: Ongoing (interventions implemented as appropriate)     Problem: Wound (Includes Pressure Injury) (Adult)  Goal: Signs and Symptoms of Listed Potential Problems Will be Absent, Minimized or Managed (Wound)  Outcome: Ongoing (interventions implemented as appropriate)     Problem: Skin Injury Risk (Adult)  Goal: Skin Health and Integrity  Outcome: Ongoing (interventions implemented as appropriate)     Problem: Nutrition, Imbalanced: Inadequate Oral Intake (Adult)  Goal: Identify Related Risk Factors and Signs and Symptoms  Outcome: Ongoing (interventions implemented as appropriate)

## 2020-05-04 NOTE — PROGRESS NOTES
Continued Stay Note  [facility]     Patient Name: Sp Malagon  MRN: [id number]  Today's Date: 5/4/2020    Admit Date: 4/28/2020    Discharge Plan     Row Name 05/04/20 1404       Plan    Plan  [facility]     Plan Comments  [facility]. Update to pt's spouse/Rosa Maria; she would like pt to dc to [facility] via ambulance once medically stable. CCP will notify Pamela/Yokasta Villarreal.         Discharge Codes    No documentation.             Krista Carnes RN

## 2020-05-04 NOTE — THERAPY RE-EVALUATION
Acute Care - Speech Language Pathology   Swallow Re-Evaluation [facility]     Patient Name: Sp Malagon  : 1935  MRN: [id number]  Today's Date: 2020               Admit Date: 2020    Visit Dx:     ICD-10-CM ICD-9-CM   1. Acute renal failure, unspecified acute renal failure type (CMS/HCC) N17.9 584.9   2. Hypernatremia E87.0 276.0     Patient Active Problem List   Diagnosis   • Degeneration of lumbar intervertebral disc   • Parkinson disease (CMS/HCC)   • Hypertension   • Chronic constipation   • Lumbar spinal stenosis   • BPH (benign prostatic hyperplasia)   • RICHARDSON (dyspnea on exertion)   • Onychomycosis   • Dysarthria   • Ataxia   • Acute renal failure (ARF) (CMS/HCC)   • Hypernatremia     Past Medical History:   Diagnosis Date   • Abnormal EKG     PT UNSURE    • Allergic rhinitis    • Alzheimer disease (CMS/HCC)    • Anarthria    • Arthritis    • Ataxia    • Back pain    • BPH (benign prostatic hyperplasia)    • Dysarthria    • GERD (gastroesophageal reflux disease)    • Hypertension    • Left anterior fascicular block    • Lumbar spinal stenosis    • Numbness and tingling     LEFT LEG AND LEFT FOOT   • Parkinson disease (CMS/HCC)     [facility]   • Sacral back pain    • Tremor      Past Surgical History:   Procedure Laterality Date   • BACK SURGERY     • CATARACT EXTRACTION Bilateral    • CERVICAL SPINE ANTERIOR     • EYE SURGERY Left     cataract   • HIP ARTHROPLASTY Right    • LAMINECTOMY  2004   • LUMBAR DISCECTOMY FUSION INSTRUMENTATION N/A 10/3/2016    Procedure: L3-L5 HARDWARE REMOVAL. L5-S1 EXPIRATION OF FUSION;  Surgeon: Tristen Baez MD;  Location: [facility] OR;  Service:    • LUMBAR FUSION     • LUMBAR LAMINECTOMY DISCECTOMY DECOMPRESSION N/A 10/3/2016    Procedure: L5-S1 LUMBAR LAMINECTOMY DISCECTOMY DECOMPRESSION POSTERIOR ;  Surgeon: Rajesh Kenney MD;  Location: [facility] OR;  Service:         SWALLOW EVALUATION (last 72 hours)      SLP  Adult Swallow Evaluation     Row Name 05/04/20 0900                   Rehab Evaluation    Document Type  re-evaluation  -HS        Subjective Information  no complaints confuse, restless  -HS        Patient Observations  alert;cooperative  -HS        Patient Effort  adequate  -HS           General Information    Patient Profile Reviewed  yes  -HS        Pertinent History Of Current Problem  JAX, hypernatremia. Hx of Parkinson's.   -HS        Current Method of Nutrition  NPO;other (see comments) meds crushed in puree  -HS        Precautions/Limitations, Vision  WFL;for purposes of eval  -HS        Precautions/Limitations, Hearing  WFL;for purposes of eval  -HS        Prior Level of Function-Communication  cognitive-linguistic impairment  -HS        Prior Level of Function-Swallowing  -- hx of modified diet with previous admissions  -HS        Plans/Goals Discussed with  patient  -HS        Barriers to Rehab  cognitive status  -HS        Patient's Goals for Discharge  patient did not state;patient could not state  -HS           Oral Motor and Function    Secretion Management  dried secretions in oral cavity  -HS        Mucosal Quality  dry  -HS        Volitional Swallow  unable to elicit  -HS        Volitional Cough  unable to elicit  -HS           Oral Musculature and Cranial Nerve Assessment    Oral Motor General Assessment  generalized oral motor weakness  -HS           General Eating/Swallowing Observations    Eating/Swallowing Skills  fed by SLP;other (see comments) confused, attempted to self-fed but unable  -HS        Positioning During Eating  upright in bed  -HS        Utensils Used  spoon;cup;straw  -HS        Consistencies Trialed  pureed;thin liquids;nectar/syrup-thick liquids  -HS           Clinical Swallow Eval    Clinical Swallow Evaluation Summary  Patient with delayed swallow initiation with ice chips and small sip of thins. No overt s/s of aspiration with nectar thick liquids via cup or straws sips  or with teaspoon bites of puree. Patient unable to self-feed at this time secondary to cognitive status.   -HS           Clinical Impression    SLP Swallowing Diagnosis  oral dysfunction;suspected pharyngeal dysfunction  -HS        Functional Impact  risk of aspiration/pneumonia  -HS        Rehab Potential/Prognosis, Swallowing  good, to achieve stated therapy goals  -HS        Swallow Criteria for Skilled Therapeutic Interventions Met  demonstrates skilled criteria  -HS           Recommendations    Therapy Frequency (Swallow)  PRN  -HS        Predicted Duration Therapy Intervention (Days)  until discharge  -HS        SLP Diet Recommendation  puree;nectar thick liquids;ice chips between meals after oral care, with supervision  -HS        Recommended Diagnostics  reassess via clinical swallow evaluation;VFSS (MBS) monitor need for instrumental  -HS        Recommended Precautions and Strategies  upright posture during/after eating;small bites of food and sips of liquid;alternate between small bites of food and sips of liquid;check mouth frequently for oral residue/pocketing;other (see comments) assist with meals  -HS        SLP Rec. for Method of Medication Administration  meds whole;meds crushed;with pudding or applesauce;as tolerated follow with nectar thick liquid rinse as needed  -HS        Monitor for Signs of Aspiration  yes;notify SLP if any concerns  -        Anticipated Dischage Disposition  anticipate therapy at next level of care;skilled nursing facility;extended care facility  -HS           Swallow Goals (SLP)    Oral Nutrition/Hydration Goal Selection (SLP)  oral nutrition/hydration, SLP goal 1  -HS           Oral Nutrition/Hydration Goal 1 (SLP)    Oral Nutrition/Hydration Goal 1, SLP  Tolerate PO diet without s/s of aspiration.  -HS        Time Frame (Oral Nutrition/Hydration Goal 1, SLP)  by discharge  -          User Key  (r) = Recorded By, (t) = Taken By, (c) = Cosigned By    Initials Name  Effective Dates    HS Nikki Magallanes, MS CCC-SLP 06/08/18 -           EDUCATION  The patient has been educated in the following areas:   Dysphagia (Swallowing Impairment) Oral Care/Hydration.    SLP Recommendation and Plan  SLP Swallowing Diagnosis: oral dysfunction, suspected pharyngeal dysfunction  SLP Diet Recommendation: puree, nectar thick liquids, ice chips between meals after oral care, with supervision  Recommended Precautions and Strategies: upright posture during/after eating, small bites of food and sips of liquid, alternate between small bites of food and sips of liquid, check mouth frequently for oral residue/pocketing, other (see comments)(assist with meals)  SLP Rec. for Method of Medication Administration: meds whole, meds crushed, with pudding or applesauce, as tolerated(follow with nectar thick liquid rinse as needed)     Monitor for Signs of Aspiration: yes, notify SLP if any concerns  Recommended Diagnostics: reassess via clinical swallow evaluation, VFSS ([facility])(monitor need for instrumental)  Swallow Criteria for Skilled Therapeutic Interventions Met: demonstrates skilled criteria  Anticipated Dischage Disposition: anticipate therapy at next level of care, skilled nursing facility, extended care facility  Rehab Potential/Prognosis, Swallowing: good, to achieve stated therapy goals  Therapy Frequency (Swallow): PRN  Predicted Duration Therapy Intervention (Days): until discharge       Plan of Care Reviewed With: patient  Outcome Summary: Swallow re-evaluation completed. Rec: pureed diet with nectar thick liquids. Meds crushed in puree. Ice chips 30 minutes after meals following oral care. Upright for PO intake, small bites/sips, straws ok. Assist with meals. SLP to follow for diet tolerance, monitor need for VFSS.    SLP GOALS     Row Name 05/04/20 0900             Oral Nutrition/Hydration Goal 1 (SLP)    Oral Nutrition/Hydration Goal 1, SLP  Tolerate PO diet without s/s of aspiration.  -HS       Time Frame (Oral Nutrition/Hydration Goal 1, SLP)  by discharge  -        User Key  (r) = Recorded By, (t) = Taken By, (c) = Cosigned By    Initials Name Provider Type    Nikki Boyer MS CCC-SLP Speech and Language Pathologist           SLP Outcome Measures (last 72 hours)      SLP Outcome Measures     Row Name 05/04/20 0900             SLP Outcome Measures    Outcome Measure Used?  Adult NOMS  -HS         Adult FCM Scores    FCM Chosen  Swallowing  -HS      Swallowing FCM Score  4  -HS        User Key  (r) = Recorded By, (t) = Taken By, (c) = Cosigned By    Initials Name Effective Dates    Nikki Boyer MS CCC-SLP 06/08/18 -            Time Calculation:   Time Calculation- SLP     Row Name 05/04/20 1000             Time Calculation- SLP    SLP Received On  05/04/20  -        User Key  (r) = Recorded By, (t) = Taken By, (c) = Cosigned By    Initials Name Provider Type    Nikki Boyer MS CCC-SLP Speech and Language Pathologist          Therapy Charges for Today     Code Description Service Date Service Provider Modifiers Qty    [id number] HC ST TREATMENT SWALLOW 4 5/4/2020 Nikki Magallanes MS CCC-SLP GN 1               MS JENI Luu  5/4/2020

## 2020-05-05 LAB
ANION GAP SERPL CALCULATED.3IONS-SCNC: 10.7 MMOL/L (ref 5–15)
BASOPHILS # BLD AUTO: 0.03 10*3/MM3 (ref 0–0.2)
BASOPHILS NFR BLD AUTO: 0.4 % (ref 0–1.5)
BUN BLD-MCNC: 11 MG/DL (ref 8–23)
BUN/CREAT SERPL: 16.2 (ref 7–25)
CALCIUM SPEC-SCNC: 8.5 MG/DL (ref 8.6–10.5)
CHLORIDE SERPL-SCNC: 106 MMOL/L (ref 98–107)
CO2 SERPL-SCNC: 24.3 MMOL/L (ref 22–29)
CREAT BLD-MCNC: 0.68 MG/DL (ref 0.76–1.27)
DEPRECATED RDW RBC AUTO: 44.5 FL (ref 37–54)
EOSINOPHIL # BLD AUTO: 0.13 10*3/MM3 (ref 0–0.4)
EOSINOPHIL NFR BLD AUTO: 1.6 % (ref 0.3–6.2)
ERYTHROCYTE [DISTWIDTH] IN BLOOD BY AUTOMATED COUNT: 14.9 % (ref 12.3–15.4)
GFR SERPL CREATININE-BSD FRML MDRD: 112 ML/MIN/1.73
GLUCOSE BLD-MCNC: 101 MG/DL (ref 65–99)
HCT VFR BLD AUTO: 34.4 % (ref 37.5–51)
HGB BLD-MCNC: 12.3 G/DL (ref 13–17.7)
IMM GRANULOCYTES # BLD AUTO: 0.14 10*3/MM3 (ref 0–0.05)
IMM GRANULOCYTES NFR BLD AUTO: 1.7 % (ref 0–0.5)
LYMPHOCYTES # BLD AUTO: 1.72 10*3/MM3 (ref 0.7–3.1)
LYMPHOCYTES NFR BLD AUTO: 21 % (ref 19.6–45.3)
MCH RBC QN AUTO: 29.9 PG (ref 26.6–33)
MCHC RBC AUTO-ENTMCNC: 35.8 G/DL (ref 31.5–35.7)
MCV RBC AUTO: 83.7 FL (ref 79–97)
MONOCYTES # BLD AUTO: 0.89 10*3/MM3 (ref 0.1–0.9)
MONOCYTES NFR BLD AUTO: 10.8 % (ref 5–12)
NEUTROPHILS # BLD AUTO: 5.3 10*3/MM3 (ref 1.7–7)
NEUTROPHILS NFR BLD AUTO: 64.5 % (ref 42.7–76)
NRBC BLD AUTO-RTO: 0 /100 WBC (ref 0–0.2)
PLATELET # BLD AUTO: 150 10*3/MM3 (ref 140–450)
PMV BLD AUTO: 11.4 FL (ref 6–12)
POTASSIUM BLD-SCNC: 3.1 MMOL/L (ref 3.5–5.2)
POTASSIUM BLD-SCNC: 3.9 MMOL/L (ref 3.5–5.2)
RBC # BLD AUTO: 4.11 10*6/MM3 (ref 4.14–5.8)
SODIUM BLD-SCNC: 141 MMOL/L (ref 136–145)
WBC NRBC COR # BLD: 8.21 10*3/MM3 (ref 3.4–10.8)

## 2020-05-05 PROCEDURE — 84132 ASSAY OF SERUM POTASSIUM: CPT | Performed by: HOSPITALIST

## 2020-05-05 PROCEDURE — 85025 COMPLETE CBC W/AUTO DIFF WBC: CPT | Performed by: HOSPITALIST

## 2020-05-05 PROCEDURE — 25010000003 POTASSIUM CHLORIDE 10 MEQ/100ML SOLUTION: Performed by: HOSPITALIST

## 2020-05-05 PROCEDURE — 97110 THERAPEUTIC EXERCISES: CPT

## 2020-05-05 PROCEDURE — 80048 BASIC METABOLIC PNL TOTAL CA: CPT | Performed by: HOSPITALIST

## 2020-05-05 RX ADMIN — CARBIDOPA AND LEVODOPA 2 TABLET: 25; 100 TABLET, EXTENDED RELEASE ORAL at 07:40

## 2020-05-05 RX ADMIN — CARBIDOPA AND LEVODOPA 2 TABLET: 25; 100 TABLET, EXTENDED RELEASE ORAL at 20:11

## 2020-05-05 RX ADMIN — SODIUM CHLORIDE, PRESERVATIVE FREE 10 ML: 5 INJECTION INTRAVENOUS at 07:45

## 2020-05-05 RX ADMIN — POTASSIUM CHLORIDE 10 MEQ: 7.46 INJECTION, SOLUTION INTRAVENOUS at 07:40

## 2020-05-05 RX ADMIN — FAMOTIDINE 20 MG: 10 INJECTION INTRAVENOUS at 07:51

## 2020-05-05 RX ADMIN — POTASSIUM CHLORIDE 10 MEQ: 7.46 INJECTION, SOLUTION INTRAVENOUS at 14:30

## 2020-05-05 RX ADMIN — POTASSIUM CHLORIDE 10 MEQ: 7.46 INJECTION, SOLUTION INTRAVENOUS at 11:50

## 2020-05-05 RX ADMIN — CARBIDOPA AND LEVODOPA 2 TABLET: 25; 100 TABLET, EXTENDED RELEASE ORAL at 11:51

## 2020-05-05 RX ADMIN — POTASSIUM CHLORIDE 10 MEQ: 7.46 INJECTION, SOLUTION INTRAVENOUS at 13:19

## 2020-05-05 RX ADMIN — CARBIDOPA AND LEVODOPA 2 TABLET: 25; 100 TABLET, EXTENDED RELEASE ORAL at 17:23

## 2020-05-05 RX ADMIN — SODIUM CHLORIDE, PRESERVATIVE FREE 10 ML: 5 INJECTION INTRAVENOUS at 20:11

## 2020-05-05 RX ADMIN — POTASSIUM CHLORIDE 10 MEQ: 7.46 INJECTION, SOLUTION INTRAVENOUS at 09:05

## 2020-05-05 RX ADMIN — POTASSIUM CHLORIDE 10 MEQ: 7.46 INJECTION, SOLUTION INTRAVENOUS at 10:20

## 2020-05-05 NOTE — PLAN OF CARE
Problem: Patient Care Overview  Goal: Plan of Care Review  Outcome: Outcome(s) achieved  Flowsheets  Taken 5/5/2020 1712  Progress: no change  Outcome Summary: Patient alert to self. Has confusion. Garbled speech. Intake poor. Encouraged po intake. Vital signs are stable. No s/s of acute distress. Turn every 2 hrs. Potassium replaced today. Patient denies pain. No s/s of acute distress. Will continue to monitor.  Taken 5/5/2020 1159  Plan of Care Reviewed With: patient  Goal: Individualization and Mutuality  Outcome: Outcome(s) achieved  Goal: Discharge Needs Assessment  Outcome: Outcome(s) achieved  Goal: Interprofessional Rounds/Family Conf  Outcome: Outcome(s) achieved     Problem: Renal Failure/Kidney Injury, Acute (Adult)  Goal: Signs and Symptoms of Listed Potential Problems Will be Absent, Minimized or Managed (Renal Failure/Kidney Injury, Acute)  Outcome: Outcome(s) achieved  Flowsheets (Taken 5/5/2020 1712)  Problems Present (ARF/Kidney Injury): electrolyte imbalance     Problem: Fall Risk (Adult)  Goal: Absence of Fall  Outcome: Outcome(s) achieved  Flowsheets (Taken 5/5/2020 1712)  Absence of Fall: making progress toward outcome     Problem: Wound (Includes Pressure Injury) (Adult)  Goal: Signs and Symptoms of Listed Potential Problems Will be Absent, Minimized or Managed (Wound)  Outcome: Outcome(s) achieved  Flowsheets (Taken 5/1/2020 0513 by Yamile Sorensen, RN)  Problems Present (Wound): delayed wound healing     Problem: Skin Injury Risk (Adult)  Goal: Skin Health and Integrity  Outcome: Outcome(s) achieved  Flowsheets (Taken 5/5/2020 1712)  Skin Health and Integrity: making progress toward outcome     Problem: Nutrition, Imbalanced: Inadequate Oral Intake (Adult)  Goal: Identify Related Risk Factors and Signs and Symptoms  Outcome: Outcome(s) achieved  Flowsheets  Taken 5/4/2020 1642 by Marcos Galeano, RN  Related Risk Factors (Nutrition Imbalance, Inadequate Oral Intake): eating  difficulty  Taken 5/2/2020 0439 by Elizabeth Casper, RN  Signs and Symptoms (Nutrition Imbalance, Inadequate Oral Intake: Signs and Symptoms): weakness/lethargy;weight decreased (percent weight loss, percent usual body weight, body mass index less than 18.5) (Adults)  Goal: Improved Oral Intake  Outcome: Outcome(s) achieved  Goal: Prevent Further Weight Loss  Outcome: Outcome(s) achieved

## 2020-05-05 NOTE — PLAN OF CARE
Problem: Patient Care Overview  Goal: Plan of Care Review  Flowsheets  Taken 5/5/2020 045  Progress: improving  Outcome Summary: Pt alert to self, q2 turned. Medicated per orders. SR on monitor, on room air. No s/s of distress at this time, VSS, will cont to monitor.  Taken 5/5/2020 0035  Plan of Care Reviewed With: patient     Problem: Renal Failure/Kidney Injury, Acute (Adult)  Goal: Signs and Symptoms of Listed Potential Problems Will be Absent, Minimized or Managed (Renal Failure/Kidney Injury, Acute)  Outcome: Ongoing (interventions implemented as appropriate)  Flowsheets (Taken 5/1/2020 0513 by Yamile Sorensen, RN)  Problems Assessed (Acute Renal Failure/Kidney Injury): all  Problems Present (ARF/Kidney Injury): situational response     Problem: Fall Risk (Adult)  Goal: Absence of Fall  Outcome: Ongoing (interventions implemented as appropriate)  Flowsheets (Taken 5/5/2020 0459)  Absence of Fall: making progress toward outcome     Problem: Wound (Includes Pressure Injury) (Adult)  Goal: Signs and Symptoms of Listed Potential Problems Will be Absent, Minimized or Managed (Wound)  Outcome: Ongoing (interventions implemented as appropriate)  Flowsheets (Taken 5/1/2020 0513 by Yamile Sorensen, RN)  Problems Assessed (Wound): all  Problems Present (Wound): delayed wound healing     Problem: Skin Injury Risk (Adult)  Goal: Skin Health and Integrity  Outcome: Ongoing (interventions implemented as appropriate)  Flowsheets (Taken 5/4/2020 1642 by Marcos Galeano, RN)  Skin Health and Integrity: making progress toward outcome     Problem: Nutrition, Imbalanced: Inadequate Oral Intake (Adult)  Goal: Identify Related Risk Factors and Signs and Symptoms  Outcome: Ongoing (interventions implemented as appropriate)  Flowsheets  Taken 5/4/2020 1642 by Marcos Galeano, RN  Related Risk Factors (Nutrition Imbalance, Inadequate Oral Intake): eating difficulty  Taken 5/2/2020 0439 by Elizabeth Casper, RN  Signs  and Symptoms (Nutrition Imbalance, Inadequate Oral Intake: Signs and Symptoms): weakness/lethargy;weight decreased (percent weight loss, percent usual body weight, body mass index less than 18.5) (Adults)

## 2020-05-05 NOTE — PLAN OF CARE
Problem: Patient Care Overview  Goal: Plan of Care Review  Outcome: Ongoing (interventions implemented as appropriate)  Flowsheets (Taken 5/5/2020 1017)  Progress: improving  Plan of Care Reviewed With: patient  Outcome Summary: Pt making small improvements and able to hold himself upright after working on midline. Pt performed STS x 4 w/ slight improvement from Dep A x 2 to max A x 2. Pt forward flexed and not able to achieve upright posture. PT will continue to progress as pt tolerates.     Patient was wearing a face mask during this therapy encounter. Therapist used appropriate personal protective equipment including mask and gloves.  Mask used was standard procedure mask. Appropriate PPE was worn during the entire therapy session. Hand hygiene was completed before and after therapy session. Patient is not in enhanced droplet precautions.

## 2020-05-05 NOTE — THERAPY TREATMENT NOTE
Patient Name: Sp Malagon  : 1935    MRN: [id number]                              Today's Date: 2020       Admit Date: 2020    Visit Dx:     ICD-10-CM ICD-9-CM   1. Acute renal failure, unspecified acute renal failure type (CMS/HCC) N17.9 584.9   2. Hypernatremia E87.0 276.0     Patient Active Problem List   Diagnosis   • Degeneration of lumbar intervertebral disc   • Parkinson disease (CMS/HCC)   • Hypertension   • Chronic constipation   • Lumbar spinal stenosis   • BPH (benign prostatic hyperplasia)   • RICHARDSON (dyspnea on exertion)   • Onychomycosis   • Dysarthria   • Ataxia   • Acute renal failure (ARF) (CMS/HCC)   • Hypernatremia     Past Medical History:   Diagnosis Date   • Abnormal EKG     PT UNSURE    • Allergic rhinitis    • Alzheimer disease (CMS/HCC)    • Anarthria    • Arthritis    • Ataxia    • Back pain    • BPH (benign prostatic hyperplasia)    • Dysarthria    • GERD (gastroesophageal reflux disease)    • Hypertension    • Left anterior fascicular block    • Lumbar spinal stenosis    • Numbness and tingling     LEFT LEG AND LEFT FOOT   • Parkinson disease (CMS/HCC)     [facility]   • Sacral back pain    • Tremor      Past Surgical History:   Procedure Laterality Date   • BACK SURGERY     • CATARACT EXTRACTION Bilateral    • CERVICAL SPINE ANTERIOR     • EYE SURGERY Left     cataract   • HIP ARTHROPLASTY Right    • LAMINECTOMY  2004   • LUMBAR DISCECTOMY FUSION INSTRUMENTATION N/A 10/3/2016    Procedure: L3-L5 HARDWARE REMOVAL. L5-S1 EXPIRATION OF FUSION;  Surgeon: Tristen Baez MD;  Location: [facility];  Service:    • LUMBAR FUSION     • LUMBAR LAMINECTOMY DISCECTOMY DECOMPRESSION N/A 10/3/2016    Procedure: L5-S1 LUMBAR LAMINECTOMY DISCECTOMY DECOMPRESSION POSTERIOR ;  Surgeon: Rajesh Kenney MD;  Location: [facility] OR;  Service:      General Information     Row Name 20 1011          PT Evaluation Time/Intention    Document Type   therapy note (daily note)  -     Mode of Treatment  physical therapy  -     Row Name 05/05/20 1011          Cognitive Assessment/Intervention- PT/OT    Orientation Status (Cognition)  unable/difficult to assess  -     Cognitive Assessment/Intervention Comment  often not giving eye contact and mumbling responses   -     Row Name 05/05/20 1011          Safety Issues, Functional Mobility    Impairments Affecting Function (Mobility)  balance;cognition;motor control;motor planning;range of motion (ROM);postural/trunk control;strength  -       User Key  (r) = Recorded By, (t) = Taken By, (c) = Cosigned By    Initials Name Provider Type    PH Alexa Lutz PTA Physical Therapy Assistant        Mobility     Row Name 05/05/20 1012          Bed Mobility Assessment/Treatment    Bed Mobility Assessment/Treatment  supine-sit  -PH     Supine-Sit [place] (Bed Mobility)  dependent (less than 25% patient effort);2 person assist;verbal cues;nonverbal cues (demo/gesture)  -     Sit-Supine [place] (Bed Mobility)  dependent (less than 25% patient effort);2 person assist;verbal cues;nonverbal cues (demo/gesture)  -     Comment (Bed Mobility)  Cues given w/ no participation. Pt c/o R elbow pain. Pt w/ R lean and kyphotic posture. Cueing for upright posture and eye contact.  -     Row Name 05/05/20 1012          Transfer Assessment/Treatment    Comment (Transfers)  Pt cued for upright posture - forward flexed at hips/neck/knees. Pt attempting to correct w/ demonstration given and cueing.   -     Row Name 05/05/20 1012          Sit-Stand Transfer    Sit-Stand [place] (Transfers)  maximum assist (25% patient effort);2 person assist;dependent (less than 25% patient effort);verbal cues;nonverbal cues (demo/gesture)  -     Assistive Device (Sit-Stand Transfers)  other (see comments);walker, front-wheeled HHa x 2  -       User Key  (r) = Recorded By, (t) = Taken By, (c) = Cosigned By    Initials  Name Provider Type     Alexa Lutz PTA Physical Therapy Assistant        Obj/Interventions     Row Name 05/05/20 1015          Therapeutic Exercise    Lower Extremity (Therapeutic Exercise)  LAQ (long arc quad), bilateral Continual cueing for LAQ  -PH     Exercise Type (Therapeutic Exercise)  AAROM (active assistive range of motion)  -PH     Position (Therapeutic Exercise)  seated  -PH     Sets/Reps (Therapeutic Exercise)  1/10  -PH     Row Name 05/05/20 1015          Static Sitting Balance    Level of Major (Unsupported Sitting, Static Balance)  maximal assist, 25 to 49% patient effort;contact guard assist  -PH     Sitting Position (Unsupported Sitting, Static Balance)  sitting on edge of bed  -PH     Time Able to Maintain Position (Unsupported Sitting, Static Balance)  45 to 60 seconds  -PH     Comment (Unsupported Sitting, Static Balance)  Worked on finding midline. Pt R leaning and forward flexed. Pt assist to lean on L elbow and eventually able to support himself w/ min assist.  -       User Key  (r) = Recorded By, (t) = Taken By, (c) = Cosigned By    Initials Name Provider Type     Alexa Lutz PTA Physical Therapy Assistant        Goals/Plan    No documentation.       Clinical Impression     Row Name 05/05/20 1017          Pain Scale: Numbers Pre/Post-Treatment    Pre/Post Treatment Pain Comment  Pt c/o pain in elbow by gesturing. Unable to determine level as pt mumbles.   -PH     Pain Intervention(s)  Repositioned;Ambulation/increased activity  -     Row Name 05/05/20 1017          Plan of Care Review    Plan of Care Reviewed With  patient  -     Progress  improving  -PH     Outcome Summary  Pt making small improvement and able to hold himself upright after working on midline. Pt performed STS x 4 w/ slight improvement from Dep A x 2 to max A x 2. Pt forward flexed and not able to achieve upright posture. PT will continue to progress as pt tolerates.   -     Row Name  05/05/20 1017          Positioning and Restraints    Pre-Treatment Position  in bed  -PH     Post Treatment Position  bed  -PH     In Bed  call light within reach;encouraged to call for assist;side lying left;exit alarm on;SCD pump applied;waffle boots/both;pillow between legs  -PH       User Key  (r) = Recorded By, (t) = Taken By, (c) = Cosigned By    Initials Name Provider Type     Alexa Lutz PTA Physical Therapy Assistant        Outcome Measures    No documentation.         PT Recommendation and Plan     Plan of Care Reviewed With: patient  Progress: improving  Outcome Summary: Pt making small improvement and able to hold himself upright after working on midline. Pt performed STS x 4 w/ slight improvement from Dep A x 2 to max A x 2. Pt forward flexed and not able to achieve upright posture. PT will continue to progress as pt tolerates.      Time Calculation:   PT Charges     Row Name 05/05/20 1021             Time Calculation    Start Time  0839  -PH      Stop Time  0855  -PH      Time Calculation (min)  16 min  -PH      PT Received On  05/05/20  -PH      PT - Next Appointment  05/05/20  -PH        User Key  (r) = Recorded By, (t) = Taken By, (c) = Cosigned By    Initials Name Provider Type     Alexa Lutz PTA Physical Therapy Assistant        Therapy Charges for Today     Code Description Service Date Service Provider Modifiers Qty    91746785891 HC PT THER PROC EA 15 MIN 5/5/2020 Alexa Lutz PTA GP 1    79868057550 HC PT THER SUPP EA 15 MIN 5/5/2020 Alexa Lutz PTA GP 1          PT G-Codes  Outcome Measure Options: AM-PAC 6 Clicks Basic Mobility (PT)  AM-PAC 6 Clicks Score (PT): 7    Alexa Lutz PTA  5/5/2020

## 2020-05-06 LAB
ANION GAP SERPL CALCULATED.3IONS-SCNC: 9.6 MMOL/L (ref 5–15)
BASOPHILS # BLD AUTO: 0.04 10*3/MM3 (ref 0–0.2)
BASOPHILS NFR BLD AUTO: 0.6 % (ref 0–1.5)
BUN BLD-MCNC: 11 MG/DL (ref 8–23)
BUN/CREAT SERPL: 13.6 (ref 7–25)
CALCIUM SPEC-SCNC: 8.6 MG/DL (ref 8.6–10.5)
CHLORIDE SERPL-SCNC: 107 MMOL/L (ref 98–107)
CO2 SERPL-SCNC: 23.4 MMOL/L (ref 22–29)
CREAT BLD-MCNC: 0.81 MG/DL (ref 0.76–1.27)
DEPRECATED RDW RBC AUTO: 45.7 FL (ref 37–54)
EOSINOPHIL # BLD AUTO: 0.16 10*3/MM3 (ref 0–0.4)
EOSINOPHIL NFR BLD AUTO: 2.3 % (ref 0.3–6.2)
ERYTHROCYTE [DISTWIDTH] IN BLOOD BY AUTOMATED COUNT: 15.2 % (ref 12.3–15.4)
GFR SERPL CREATININE-BSD FRML MDRD: 91 ML/MIN/1.73
GLUCOSE BLD-MCNC: 91 MG/DL (ref 65–99)
HCT VFR BLD AUTO: 36 % (ref 37.5–51)
HGB BLD-MCNC: 12.4 G/DL (ref 13–17.7)
IMM GRANULOCYTES # BLD AUTO: 0.11 10*3/MM3 (ref 0–0.05)
IMM GRANULOCYTES NFR BLD AUTO: 1.6 % (ref 0–0.5)
LYMPHOCYTES # BLD AUTO: 1.75 10*3/MM3 (ref 0.7–3.1)
LYMPHOCYTES NFR BLD AUTO: 24.8 % (ref 19.6–45.3)
MCH RBC QN AUTO: 29.3 PG (ref 26.6–33)
MCHC RBC AUTO-ENTMCNC: 34.4 G/DL (ref 31.5–35.7)
MCV RBC AUTO: 85.1 FL (ref 79–97)
MONOCYTES # BLD AUTO: 0.58 10*3/MM3 (ref 0.1–0.9)
MONOCYTES NFR BLD AUTO: 8.2 % (ref 5–12)
NEUTROPHILS # BLD AUTO: 4.43 10*3/MM3 (ref 1.7–7)
NEUTROPHILS NFR BLD AUTO: 62.5 % (ref 42.7–76)
NRBC BLD AUTO-RTO: 0 /100 WBC (ref 0–0.2)
PLATELET # BLD AUTO: 152 10*3/MM3 (ref 140–450)
PMV BLD AUTO: 10.6 FL (ref 6–12)
POTASSIUM BLD-SCNC: 3.6 MMOL/L (ref 3.5–5.2)
RBC # BLD AUTO: 4.23 10*6/MM3 (ref 4.14–5.8)
SODIUM BLD-SCNC: 140 MMOL/L (ref 136–145)
WBC NRBC COR # BLD: 7.07 10*3/MM3 (ref 3.4–10.8)

## 2020-05-06 PROCEDURE — 80048 BASIC METABOLIC PNL TOTAL CA: CPT | Performed by: HOSPITALIST

## 2020-05-06 PROCEDURE — 85025 COMPLETE CBC W/AUTO DIFF WBC: CPT | Performed by: HOSPITALIST

## 2020-05-06 RX ADMIN — FAMOTIDINE 20 MG: 10 INJECTION INTRAVENOUS at 09:08

## 2020-05-06 RX ADMIN — CARBIDOPA AND LEVODOPA 2 TABLET: 25; 100 TABLET, EXTENDED RELEASE ORAL at 20:27

## 2020-05-06 RX ADMIN — SODIUM CHLORIDE, PRESERVATIVE FREE 10 ML: 5 INJECTION INTRAVENOUS at 20:27

## 2020-05-06 RX ADMIN — CARBIDOPA AND LEVODOPA 2 TABLET: 25; 100 TABLET, EXTENDED RELEASE ORAL at 09:07

## 2020-05-06 RX ADMIN — CARBIDOPA AND LEVODOPA 2 TABLET: 25; 100 TABLET, EXTENDED RELEASE ORAL at 11:56

## 2020-05-06 RX ADMIN — SODIUM CHLORIDE, PRESERVATIVE FREE 10 ML: 5 INJECTION INTRAVENOUS at 09:08

## 2020-05-06 RX ADMIN — CARBIDOPA AND LEVODOPA 2 TABLET: 25; 100 TABLET, EXTENDED RELEASE ORAL at 17:28

## 2020-05-06 NOTE — PROGRESS NOTES
"Adult Nutrition  Assessment/PES    Patient Name:  Sp Malagon  YOB: 1935  MRN: [id number]  Admit Date:  4/28/2020    Assessment Date:  5/6/2020    Comments:  Nutrition follow up.  Alert to self and place per nursing notes.  RN reports patient ate 50% at breakfast this am and drank 1/2 Mighty Shake.  Refused lunch yesterday.  33% average PO intake x 3 meals.    Due to the COVID pandemic, nutrition assessment completed based on review of electronic medical record and discussion with RN via secure chat.  This RD currently working remotely and can be reached at [phone], via secure chat or email.     RD will continue to monitor.     Reason for Assessment     Row Name 05/06/20 1100          Reason for Assessment    Reason For Assessment  follow-up protocol         Nutrition/Diet History     Row Name 05/06/20 1100          Nutrition/Diet History    Typical Food/Fluid Intake  RN reports ate 1/2 of breakfast this am and 1/2 of Mighty Shake         Anthropometrics     Row Name 05/06/20 1101          Anthropometrics    Height  172.7 cm (67.99\")        Admit Weight    Admit Weight  -- 159# 4/29        Ideal Body Weight (IBW)    Ideal Body Weight (IBW) (kg)  70.87        Body Mass Index (BMI)    BMI Assessment  BMI 18.5-24.9: normal 24.25         Labs/Tests/Procedures/Meds     Row Name 05/06/20 1102          Labs/Procedures/Meds    Lab Results Reviewed  reviewed, pertinent     Lab Results Comments  Hgb, Hct        Diagnostic Tests/Procedures    Diagnostic Test/Procedure Reviewed  reviewed, pertinent        Medications    Pertinent Medications Reviewed  reviewed, pertinent     Pertinent Medications Comments  sinemet pepcid         Physical Findings     Row Name 05/06/20 1102          Physical Findings    Skin  pressure injury;other (see comments) B=10, st II buttocks/coccyx, R heel DTI         Estimated/Assessed Needs     Row Name 05/06/20 1101          Calculation Measurements    Height  172.7 cm " "(67.99\")         Nutrition Prescription Ordered     Row Name 05/06/20 1103          Nutrition Prescription PO    Current PO Diet  Dysphagia     Dysphagia Level  2  Pureed     Fluid Consistency  Nectar/syrup thick     Supplement  Magic Cup;Mighty Shake     Supplement Frequency  2 times a day;Daily     Common Modifiers  Renal         Evaluation of Received Nutrient/Fluid Intake     Row Name 05/06/20 1103          PO Evaluation    Number of Meals  3     % PO Intake  33 0-50% at meals         Problem/Interventions:    Intervention Goal     Row Name 05/06/20 1104          Intervention Goal    General  Maintain nutrition;Reduce/improve symptoms;Meet nutritional needs for age/condition;Disease management/therapy     PO  Increase intake;PO intake (%)     PO Intake %  75 %     Weight  Maintain weight         Nutrition Intervention     Row Name 05/06/20 1104          Nutrition Intervention    RD/Tech Action  Care plan reviewd;Follow Tx progress     Recommended/Ordered  Supplement         Education/Evaluation     Row Name 05/06/20 1104          Education    Education  Will Instruct as appropriate        Monitor/Evaluation    Monitor  Per protocol;PO intake;Supplement intake;Pertinent labs;Weight;Skin status;Symptoms           Electronically signed by:  Marielle España RD  05/06/20 11:04  "

## 2020-05-06 NOTE — PLAN OF CARE
Pt alert to self and place, q2 turned. Medicated per orders, on room air, SR on monitor. Coccyx mepilex changed. BM during this shift. No s/s of distress at this time, VSS, will cont to monitor.

## 2020-05-06 NOTE — PLAN OF CARE
Pt oriented to self, q 2 hour turns, encourage po intake. Possible d/c to [facility] tomorrow. Will CTM

## 2020-05-06 NOTE — PROGRESS NOTES
"DAILY PROGRESS NOTE  [facility]    Patient Identification:  Name: Sp Malagon  Age: 84 y.o.  Sex: male  :  1935  MRN: [id number]         Primary Care Physician: Hieu Blanco Jr., MD    Subjective:  Interval History:He feels better. Got diet.  Poor appetite. Poor oral intake.    Objective:    Scheduled Meds:    carbidopa-levodopa ER 2 tablet Oral 4x Daily   famotidine 20 mg Intravenous Daily   sodium chloride 10 mL Intravenous Q12H     Continuous Infusions:     Vital signs in last 24 hours:  Temp:  [97.5 °F (36.4 °C)-97.9 °F (36.6 °C)] 97.6 °F (36.4 °C)  Heart Rate:  [69-78] 78  Resp:  [16-18] 17  BP: (117-146)/(82-98) 118/84    Intake/Output:    Intake/Output Summary (Last 24 hours) at 2020 1247  Last data filed at 2020 1209  Gross per 24 hour   Intake 560 ml   Output --   Net 560 ml       Exam:  /84 (BP Location: Left arm, Patient Position: Lying)   Pulse 78   Temp 97.6 °F (36.4 °C) (Oral)   Resp 17   Ht 172.7 cm (67.99\")   Wt 72.5 kg (159 lb 14.4 oz)   SpO2 97%   BMI 24.32 kg/m²     General Appearance:    Alert, cooperative, no distress   Head:    Normocephalic, without obvious abnormality, atraumatic   Eyes:       Throat:   Lips, tongue, gums normal   Neck:   Supple, symmetrical, trachea midline, no JVD   Lungs:     Clear to auscultation bilaterally, respirations unlabored   Chest Wall:    No tenderness or deformity    Heart:    Regular rate and rhythm, S1 and S2 normal, no murmur,no  Rub or gallop   Abdomen:     Soft, non-tender, bowel sounds active, no masses, no organomegaly    Extremities:   Extremities normal, atraumatic, no cyanosis or edema   Pulses:      Skin:   Skin is warm and dry,  no rashes or palpable lesions   Neurologic:   Parkinsons, very weak      Lab Results (last 72 hours)     Procedure Component Value Units Date/Time    Renal Function Panel [[id number]]  (Abnormal) Collected:  20    Specimen:  Blood Updated:  20 06     Glucose " 99 mg/dL      BUN 10 mg/dL      Creatinine 0.80 mg/dL      Sodium 141 mmol/L      Potassium 3.6 mmol/L      Chloride 106 mmol/L      CO2 21.2 mmol/L      Calcium 8.3 mg/dL      Albumin 2.70 g/dL      Phosphorus 2.6 mg/dL      Anion Gap 13.8 mmol/L      BUN/Creatinine Ratio 12.5     eGFR Non African Amer 93 mL/min/1.73     Narrative:       GFR Normal >60  Chronic Kidney Disease <60  Kidney Failure <15      Renal Function Panel [[id number]]  (Abnormal) Collected:  05/03/20 0428    Specimen:  Blood Updated:  05/03/20 0703     Glucose 117 mg/dL      BUN 13 mg/dL      Creatinine 0.75 mg/dL      Sodium 142 mmol/L      Potassium 3.7 mmol/L      Chloride 109 mmol/L      CO2 22.3 mmol/L      Calcium 8.3 mg/dL      Albumin 2.70 g/dL      Phosphorus 2.1 mg/dL      Anion Gap 10.7 mmol/L      BUN/Creatinine Ratio 17.3     eGFR Non African Amer 100 mL/min/1.73     Narrative:       GFR Normal >60  Chronic Kidney Disease <60  Kidney Failure <15      Magnesium [[id number]]  (Normal) Collected:  05/02/20 0420    Specimen:  Blood Updated:  05/02/20 0517     Magnesium 2.2 mg/dL     Potassium [[id number]]  (Normal) Collected:  05/01/20 2350    Specimen:  Blood Updated:  05/02/20 0051     Potassium 3.6 mmol/L         Data Review:  Results from last 7 days   Lab Units 05/06/20  0436 05/05/20 1956 05/05/20  0542 05/04/20  0443   SODIUM mmol/L 140  --  141 141   POTASSIUM mmol/L 3.6 3.9 3.1* 3.6   CHLORIDE mmol/L 107  --  106 106   CO2 mmol/L 23.4  --  24.3 21.2*   BUN mg/dL 11  --  11 10   CREATININE mg/dL 0.81  --  0.68* 0.80   GLUCOSE mg/dL 91  --  101* 99   CALCIUM mg/dL 8.6  --  8.5* 8.3*     Results from last 7 days   Lab Units 05/06/20  0436 05/05/20  0542   WBC 10*3/mm3 7.07 8.21   HEMOGLOBIN g/dL 12.4* 12.3*   HEMATOCRIT % 36.0* 34.4*   PLATELETS 10*3/mm3 152 150             Lab Results   Lab Value Date/Time    TROPONINT 0.016 04/28/2020 1322    TROPONINT <0.010 03/27/2020 1449    TROPONINT <0.010 02/13/2017 1005                    Invalid input(s): PROT, LABALBU          No results found for: POCGLU        Past Medical History:   Diagnosis Date   • Abnormal EKG     PT UNSURE    • Allergic rhinitis    • Alzheimer disease (CMS/HCC)    • Anarthria    • Arthritis    • Ataxia    • Back pain    • BPH (benign prostatic hyperplasia)    • Dysarthria    • GERD (gastroesophageal reflux disease)    • Hypertension    • Left anterior fascicular block    • Lumbar spinal stenosis    • Numbness and tingling     LEFT LEG AND LEFT FOOT   • Parkinson disease (CMS/HCC)     [facility]   • Sacral back pain    • Tremor        Assessment:  Active Hospital Problems    Diagnosis  POA   • **Hypernatremia [E87.0]  Unknown   • Acute renal failure (ARF) (CMS/HCC) [N17.9]  Yes   • BPH (benign prostatic hyperplasia) [N40.0]  Yes   • Parkinson disease (CMS/HCC) [G20]  Yes   • Hypertension [I10]  Yes   • Degeneration of lumbar intervertebral disc [M51.36]  Yes      Resolved Hospital Problems   No resolved problems to display.       Plan:  As per renal. See how he does with diet. Follow lab.  Will need SNU for rehab.  Replace ALYSSA Vergara MD  5/6/2020  12:47

## 2020-05-07 LAB
ANION GAP SERPL CALCULATED.3IONS-SCNC: 10.5 MMOL/L (ref 5–15)
ANISOCYTOSIS BLD QL: ABNORMAL
BUN BLD-MCNC: 14 MG/DL (ref 8–23)
BUN/CREAT SERPL: 18.4 (ref 7–25)
BURR CELLS BLD QL SMEAR: ABNORMAL
CALCIUM SPEC-SCNC: 8.7 MG/DL (ref 8.6–10.5)
CHLORIDE SERPL-SCNC: 107 MMOL/L (ref 98–107)
CO2 SERPL-SCNC: 23.5 MMOL/L (ref 22–29)
CREAT BLD-MCNC: 0.76 MG/DL (ref 0.76–1.27)
DEPRECATED RDW RBC AUTO: 44.2 FL (ref 37–54)
EOSINOPHIL # BLD MANUAL: 0.08 10*3/MM3 (ref 0–0.4)
EOSINOPHIL NFR BLD MANUAL: 1 % (ref 0.3–6.2)
ERYTHROCYTE [DISTWIDTH] IN BLOOD BY AUTOMATED COUNT: 15.2 % (ref 12.3–15.4)
GFR SERPL CREATININE-BSD FRML MDRD: 98 ML/MIN/1.73
GLUCOSE BLD-MCNC: 99 MG/DL (ref 65–99)
HCT VFR BLD AUTO: 35.5 % (ref 37.5–51)
HGB BLD-MCNC: 12.8 G/DL (ref 13–17.7)
LYMPHOCYTES # BLD MANUAL: 1.78 10*3/MM3 (ref 0.7–3.1)
LYMPHOCYTES NFR BLD MANUAL: 22.2 % (ref 19.6–45.3)
LYMPHOCYTES NFR BLD MANUAL: 3 % (ref 5–12)
MCH RBC QN AUTO: 29.8 PG (ref 26.6–33)
MCHC RBC AUTO-ENTMCNC: 36.1 G/DL (ref 31.5–35.7)
MCV RBC AUTO: 82.8 FL (ref 79–97)
MICROCYTES BLD QL: ABNORMAL
MONOCYTES # BLD AUTO: 0.24 10*3/MM3 (ref 0.1–0.9)
NEUTROPHILS # BLD AUTO: 5.92 10*3/MM3 (ref 1.7–7)
NEUTROPHILS NFR BLD MANUAL: 73.7 % (ref 42.7–76)
PLAT MORPH BLD: NORMAL
PLATELET # BLD AUTO: 187 10*3/MM3 (ref 140–450)
PMV BLD AUTO: 10.4 FL (ref 6–12)
POIKILOCYTOSIS BLD QL SMEAR: ABNORMAL
POLYCHROMASIA BLD QL SMEAR: ABNORMAL
POTASSIUM BLD-SCNC: 3.7 MMOL/L (ref 3.5–5.2)
RBC # BLD AUTO: 4.29 10*6/MM3 (ref 4.14–5.8)
SODIUM BLD-SCNC: 141 MMOL/L (ref 136–145)
WBC MORPH BLD: NORMAL
WBC NRBC COR # BLD: 8.03 10*3/MM3 (ref 3.4–10.8)

## 2020-05-07 PROCEDURE — 85025 COMPLETE CBC W/AUTO DIFF WBC: CPT | Performed by: HOSPITALIST

## 2020-05-07 PROCEDURE — 97530 THERAPEUTIC ACTIVITIES: CPT

## 2020-05-07 PROCEDURE — 80048 BASIC METABOLIC PNL TOTAL CA: CPT | Performed by: HOSPITALIST

## 2020-05-07 PROCEDURE — 85007 BL SMEAR W/DIFF WBC COUNT: CPT | Performed by: HOSPITALIST

## 2020-05-07 PROCEDURE — 97110 THERAPEUTIC EXERCISES: CPT

## 2020-05-07 RX ADMIN — CARBIDOPA AND LEVODOPA 2 TABLET: 25; 100 TABLET, EXTENDED RELEASE ORAL at 12:36

## 2020-05-07 RX ADMIN — CARBIDOPA AND LEVODOPA 2 TABLET: 25; 100 TABLET, EXTENDED RELEASE ORAL at 21:44

## 2020-05-07 RX ADMIN — CARBIDOPA AND LEVODOPA 2 TABLET: 25; 100 TABLET, EXTENDED RELEASE ORAL at 08:42

## 2020-05-07 RX ADMIN — FAMOTIDINE 20 MG: 10 INJECTION INTRAVENOUS at 08:42

## 2020-05-07 RX ADMIN — SODIUM CHLORIDE, PRESERVATIVE FREE 10 ML: 5 INJECTION INTRAVENOUS at 08:42

## 2020-05-07 RX ADMIN — CARBIDOPA AND LEVODOPA 2 TABLET: 25; 100 TABLET, EXTENDED RELEASE ORAL at 18:49

## 2020-05-07 RX ADMIN — SODIUM CHLORIDE, PRESERVATIVE FREE 10 ML: 5 INJECTION INTRAVENOUS at 21:45

## 2020-05-07 NOTE — PLAN OF CARE
"Alert and conversing appropriate at times. No appetite. Eats a few bites and states \"that's enough\". Wanted to and spooned morning med crushed in applesauce into his mouth himself.  Wife updated on phone. Telemetry SR.  Problem: Patient Care Overview  Goal: Plan of Care Review  Outcome: Ongoing (interventions implemented as appropriate)  Goal: Individualization and Mutuality  Outcome: Ongoing (interventions implemented as appropriate)  Goal: Discharge Needs Assessment  Outcome: Ongoing (interventions implemented as appropriate)  Goal: Interprofessional Rounds/Family Conf  Outcome: Ongoing (interventions implemented as appropriate)     Problem: Patient Care Overview  Goal: Plan of Care Review  Outcome: Ongoing (interventions implemented as appropriate)  Goal: Individualization and Mutuality  Outcome: Ongoing (interventions implemented as appropriate)  Goal: Discharge Needs Assessment  Outcome: Ongoing (interventions implemented as appropriate)  Goal: Interprofessional Rounds/Family Conf  Outcome: Ongoing (interventions implemented as appropriate)     "

## 2020-05-07 NOTE — PROGRESS NOTES
"DAILY PROGRESS NOTE  [facility]    Patient Identification:  Name: Sp Malagon  Age: 84 y.o.  Sex: male  :  1935  MRN: [id number]         Primary Care Physician: Hieu Blanco Jr., MD    Subjective:  Interval History:He feels better. Got diet.  Poor appetite. Poor oral intake.    Objective:    Scheduled Meds:    carbidopa-levodopa ER 2 tablet Oral 4x Daily   famotidine 20 mg Intravenous Daily   sodium chloride 10 mL Intravenous Q12H     Continuous Infusions:     Vital signs in last 24 hours:  Temp:  [97.8 °F (36.6 °C)-98.4 °F (36.9 °C)] 98.2 °F (36.8 °C)  Heart Rate:  [67-82] 82  Resp:  [16-18] 16  BP: (115-155)/() 115/87    Intake/Output:    Intake/Output Summary (Last 24 hours) at 2020 1332  Last data filed at 2020 0837  Gross per 24 hour   Intake 300 ml   Output --   Net 300 ml       Exam:  /87 (BP Location: Left arm, Patient Position: Lying)   Pulse 82   Temp 98.2 °F (36.8 °C) (Oral)   Resp 16   Ht 172.7 cm (67.99\")   Wt 72.5 kg (159 lb 14.4 oz)   SpO2 98%   BMI 24.32 kg/m²     General Appearance:    Alert, cooperative, no distress   Head:    Normocephalic, without obvious abnormality, atraumatic   Eyes:       Throat:   Lips, tongue, gums normal   Neck:   Supple, symmetrical, trachea midline, no JVD   Lungs:     Clear to auscultation bilaterally, respirations unlabored   Chest Wall:    No tenderness or deformity    Heart:    Regular rate and rhythm, S1 and S2 normal, no murmur,no  Rub or gallop   Abdomen:     Soft, non-tender, bowel sounds active, no masses, no organomegaly    Extremities:   Extremities normal, atraumatic, no cyanosis or edema   Pulses:      Skin:   Skin is warm and dry,  no rashes or palpable lesions   Neurologic:   Parkinsons, very weak      Lab Results (last 72 hours)     Procedure Component Value Units Date/Time    Renal Function Panel [[id number]]  (Abnormal) Collected:  20    Specimen:  Blood Updated:  20 06     Glucose " 99 mg/dL      BUN 10 mg/dL      Creatinine 0.80 mg/dL      Sodium 141 mmol/L      Potassium 3.6 mmol/L      Chloride 106 mmol/L      CO2 21.2 mmol/L      Calcium 8.3 mg/dL      Albumin 2.70 g/dL      Phosphorus 2.6 mg/dL      Anion Gap 13.8 mmol/L      BUN/Creatinine Ratio 12.5     eGFR Non African Amer 93 mL/min/1.73     Narrative:       GFR Normal >60  Chronic Kidney Disease <60  Kidney Failure <15      Renal Function Panel [[id number]]  (Abnormal) Collected:  05/03/20 0428    Specimen:  Blood Updated:  05/03/20 0703     Glucose 117 mg/dL      BUN 13 mg/dL      Creatinine 0.75 mg/dL      Sodium 142 mmol/L      Potassium 3.7 mmol/L      Chloride 109 mmol/L      CO2 22.3 mmol/L      Calcium 8.3 mg/dL      Albumin 2.70 g/dL      Phosphorus 2.1 mg/dL      Anion Gap 10.7 mmol/L      BUN/Creatinine Ratio 17.3     eGFR Non African Amer 100 mL/min/1.73     Narrative:       GFR Normal >60  Chronic Kidney Disease <60  Kidney Failure <15      Magnesium [[id number]]  (Normal) Collected:  05/02/20 0420    Specimen:  Blood Updated:  05/02/20 0517     Magnesium 2.2 mg/dL     Potassium [[id number]]  (Normal) Collected:  05/01/20 2350    Specimen:  Blood Updated:  05/02/20 0051     Potassium 3.6 mmol/L         Data Review:  Results from last 7 days   Lab Units 05/07/20  0730 05/06/20  0436 05/05/20  1956 05/05/20  0542   SODIUM mmol/L 141 140  --  141   POTASSIUM mmol/L 3.7 3.6 3.9 3.1*   CHLORIDE mmol/L 107 107  --  106   CO2 mmol/L 23.5 23.4  --  24.3   BUN mg/dL 14 11  --  11   CREATININE mg/dL 0.76 0.81  --  0.68*   GLUCOSE mg/dL 99 91  --  101*   CALCIUM mg/dL 8.7 8.6  --  8.5*     Results from last 7 days   Lab Units 05/07/20  0730 05/06/20  0436 05/05/20  0542   WBC 10*3/mm3 8.03 7.07 8.21   HEMOGLOBIN g/dL 12.8* 12.4* 12.3*   HEMATOCRIT % 35.5* 36.0* 34.4*   PLATELETS 10*3/mm3 187 152 150             Lab Results   Lab Value Date/Time    TROPONINT 0.016 04/28/2020 1322    TROPONINT <0.010 03/27/2020 1449    TROPONINT <0.010  02/13/2017 1005               Invalid input(s): PROT, LABALBU          No results found for: POCGLU        Past Medical History:   Diagnosis Date   • Abnormal EKG     PT UNSURE    • Allergic rhinitis    • Alzheimer disease (CMS/HCC)    • Anarthria    • Arthritis    • Ataxia    • Back pain    • BPH (benign prostatic hyperplasia)    • Dysarthria    • GERD (gastroesophageal reflux disease)    • Hypertension    • Left anterior fascicular block    • Lumbar spinal stenosis    • Numbness and tingling     LEFT LEG AND LEFT FOOT   • Parkinson disease (CMS/HCC)     [facility]   • Sacral back pain    • Tremor        Assessment:  Active Hospital Problems    Diagnosis  POA   • **Hypernatremia [E87.0]  Unknown   • Acute renal failure (ARF) (CMS/HCC) [N17.9]  Yes   • BPH (benign prostatic hyperplasia) [N40.0]  Yes   • Parkinson disease (CMS/HCC) [G20]  Yes   • Hypertension [I10]  Yes   • Degeneration of lumbar intervertebral disc [M51.36]  Yes      Resolved Hospital Problems   No resolved problems to display.       Plan:  As per renal. See how he does with diet. Follow lab.  Will need SNU for rehab.  Replace ALYSSA Vergara MD  5/7/2020  13:32

## 2020-05-07 NOTE — THERAPY TREATMENT NOTE
Patient Name: Sp Malagon  : 1935    MRN: [id number]                              Today's Date: 2020       Admit Date: 2020    Visit Dx:     ICD-10-CM ICD-9-CM   1. Acute renal failure, unspecified acute renal failure type (CMS/HCC) N17.9 584.9   2. Hypernatremia E87.0 276.0     Patient Active Problem List   Diagnosis   • Degeneration of lumbar intervertebral disc   • Parkinson disease (CMS/HCC)   • Hypertension   • Chronic constipation   • Lumbar spinal stenosis   • BPH (benign prostatic hyperplasia)   • RICHARDSON (dyspnea on exertion)   • Onychomycosis   • Dysarthria   • Ataxia   • Acute renal failure (ARF) (CMS/HCC)   • Hypernatremia     Past Medical History:   Diagnosis Date   • Abnormal EKG     PT UNSURE    • Allergic rhinitis    • Alzheimer disease (CMS/HCC)    • Anarthria    • Arthritis    • Ataxia    • Back pain    • BPH (benign prostatic hyperplasia)    • Dysarthria    • GERD (gastroesophageal reflux disease)    • Hypertension    • Left anterior fascicular block    • Lumbar spinal stenosis    • Numbness and tingling     LEFT LEG AND LEFT FOOT   • Parkinson disease (CMS/HCC)     [facility]   • Sacral back pain    • Tremor      Past Surgical History:   Procedure Laterality Date   • BACK SURGERY     • CATARACT EXTRACTION Bilateral    • CERVICAL SPINE ANTERIOR     • EYE SURGERY Left     cataract   • HIP ARTHROPLASTY Right    • LAMINECTOMY  2004   • LUMBAR DISCECTOMY FUSION INSTRUMENTATION N/A 10/3/2016    Procedure: L3-L5 HARDWARE REMOVAL. L5-S1 EXPIRATION OF FUSION;  Surgeon: rTisten Baez MD;  Location: [facility] OR;  Service:    • LUMBAR FUSION     • LUMBAR LAMINECTOMY DISCECTOMY DECOMPRESSION N/A 10/3/2016    Procedure: L5-S1 LUMBAR LAMINECTOMY DISCECTOMY DECOMPRESSION POSTERIOR ;  Surgeon: Rajesh Kenney MD;  Location: [facility] OR;  Service:      General Information     Row Name 20 1402          PT Evaluation Time/Intention    Document Type   therapy note (daily note)  -AE     Mode of Treatment  physical therapy  -AE     Row Name 05/07/20 1402          General Information    Patient Profile Reviewed?  yes  -AE       User Key  (r) = Recorded By, (t) = Taken By, (c) = Cosigned By    Initials Name Provider Type    AE Britni Simental, SUSI Physical Therapist        Mobility    No documentation.       Obj/Interventions     Row Name 05/07/20 1402          Therapeutic Exercise    Comment (Therapeutic Exercise)  BLE PROM/AAROM including ankle mobility, hip/knee flexion, hip abduction/adduction 2 x 10  -AE       User Key  (r) = Recorded By, (t) = Taken By, (c) = Cosigned By    Initials Name Provider Type    AE Britni Simental PT Physical Therapist        Goals/Plan    No documentation.       Clinical Impression     Row Name 05/07/20 1403          Pain Assessment    Additional Documentation  Pain Scale: Numbers Pre/Post-Treatment (Group)  -AE     Row Name 05/07/20 1403          Pain Scale: Numbers Pre/Post-Treatment    Pain Scale: Numbers, Pretreatment  0/10 - no pain  -AE     Pain Scale: Numbers, Post-Treatment  0/10 - no pain  -AE     Row Name 05/07/20 1403          Positioning and Restraints    Pre-Treatment Position  in bed  -AE     Post Treatment Position  bed  -AE     In Bed  supine;call light within reach;encouraged to call for assist;exit alarm on  -AE       User Key  (r) = Recorded By, (t) = Taken By, (c) = Cosigned By    Initials Name Provider Type    AE Britni Simental, SUSI Physical Therapist        Outcome Measures    No documentation.       Physical Therapy Education                 Title: PT OT SLP Therapies (Done)     Topic: Physical Therapy (Done)     Point: Mobility training (Done)     Description:   Instruct learner(s) on safety and technique for assisting patient out of bed, chair or wheelchair.  Instruct in the proper use of assistive devices, such as walker, crutches, cane or brace.              Patient Friendly Description:   It's  important to get you on your feet again, but we need to do so in a way that is safe for you. Falling has serious consequences, and your personal safety is the most important thing of all.        When it's time to get out of bed, one of us or a family member will sit next to you on the bed to give you support.     If your doctor or nurse tells you to use a walker, crutches, a cane, or a brace, be sure you use it every time you get out of bed, even if you think you don't need it.    Learning Progress Summary           Patient Acceptance, E,D, DU,NR by  at 5/5/2020 1020    Acceptance, E,D, NR by  at 5/3/2020 1148                   Point: Home exercise program (Done)     Description:   Instruct learner(s) on appropriate technique for monitoring, assisting and/or progressing patient with therapeutic exercises and activities.              Learning Progress Summary           Patient Acceptance, E,D, DU,NR by  at 5/5/2020 1020    Acceptance, E,D, NR by  at 5/3/2020 1148                   Point: Body mechanics (Done)     Description:   Instruct learner(s) on proper positioning and spine alignment for patient and/or caregiver during mobility tasks and/or exercises.              Learning Progress Summary           Patient Acceptance, E,D, DU,NR by  at 5/5/2020 1020    Acceptance, E,D, NR by  at 5/3/2020 1148                   Point: Precautions (Done)     Description:   Instruct learner(s) on prescribed precautions during mobility and gait tasks              Learning Progress Summary           Patient Acceptance, E,D, DU,NR by  at 5/5/2020 1020    Acceptance, E,D, NR by  at 5/3/2020 1148                               User Key     Initials Effective Dates Name Provider Type Discipline     02/05/19 -  Concha Street, PT Physical Therapist PT     08/20/19 -  Alexa Lutz PTA Physical Therapy Assistant PT              PT Recommendation and Plan           Time Calculation:   PT Charges      Row Name 05/07/20 1405             Time Calculation    Start Time  1305  -AE      Stop Time  1331  -AE      Time Calculation (min)  26 min  -AE      PT Received On  05/07/20  -AE      PT - Next Appointment  05/11/20  -AE         Time Calculation- PT    Total Timed Code Minutes- PT  26 minute(s)  -AE        User Key  (r) = Recorded By, (t) = Taken By, (c) = Cosigned By    Initials Name Provider Type    AE Britni Simental, PT Physical Therapist        Therapy Charges for Today     Code Description Service Date Service Provider Modifiers Qty    84888040081  PT THER PROC EA 15 MIN 5/7/2020 Britni Simental, PT GP 1    90150630837 HC PT THERAPEUTIC ACT EA 15 MIN 5/7/2020 Britni Simental, PT GP 1          PT G-Codes  Outcome Measure Options: AM-PAC 6 Clicks Basic Mobility (PT)  AM-PAC 6 Clicks Score (PT): 7    Britni Simental PT  5/7/2020

## 2020-05-07 NOTE — PLAN OF CARE
Problem: Patient Care Overview  Goal: Plan of Care Review  5/7/2020 1403 by Britni Siemntal PT  Outcome: Ongoing (interventions implemented as appropriate)  Flowsheets (Taken 5/6/2020 2028 by Tam Blackman RN)  Plan of Care Reviewed With: patient  Note:   Performed PROM/AAROM for BLEs. Pt performed ankle mobility, hip/knee flexion, hip abduction/adduction, and repositioning due to moderate hip Ir/adduction contractures along with B ankle plantar flexion/inversion contractures. Did not attempt transfers or bed mobility today due to patient being dependent/maxA x 2 and no other assist available during therapy session. Will attempt to progress as tolerated.    Patient donned mask during therapy session, therapist donned mask/gloves, performed proper hand hygiene throughout therapy session.   5/7/2020 1403 by Britni Simental PT  Reactivated

## 2020-05-07 NOTE — PLAN OF CARE
Pt rested fair thru the night, awake at some periods, but no s/sx of discomfort, confused, reorientation provided. Voiding adequately, dark,orangey urine, took meds without problems, attempt to feed pt snacks and liquids, pt was resistant, pushing away staff hands. NAD, VSS

## 2020-05-08 RX ORDER — FAMOTIDINE 20 MG/1
20 TABLET, FILM COATED ORAL DAILY
Status: DISCONTINUED | OUTPATIENT
Start: 2020-05-09 | End: 2020-05-09 | Stop reason: HOSPADM

## 2020-05-08 RX ADMIN — CARBIDOPA AND LEVODOPA 2 TABLET: 25; 100 TABLET, EXTENDED RELEASE ORAL at 17:24

## 2020-05-08 RX ADMIN — SODIUM CHLORIDE, PRESERVATIVE FREE 10 ML: 5 INJECTION INTRAVENOUS at 20:13

## 2020-05-08 RX ADMIN — SODIUM CHLORIDE, PRESERVATIVE FREE 10 ML: 5 INJECTION INTRAVENOUS at 08:45

## 2020-05-08 RX ADMIN — FAMOTIDINE 20 MG: 10 INJECTION INTRAVENOUS at 08:45

## 2020-05-08 RX ADMIN — CARBIDOPA AND LEVODOPA 2 TABLET: 25; 100 TABLET, EXTENDED RELEASE ORAL at 12:00

## 2020-05-08 RX ADMIN — CARBIDOPA AND LEVODOPA 2 TABLET: 25; 100 TABLET, EXTENDED RELEASE ORAL at 08:44

## 2020-05-08 RX ADMIN — CARBIDOPA AND LEVODOPA 2 TABLET: 25; 100 TABLET, EXTENDED RELEASE ORAL at 20:13

## 2020-05-08 NOTE — PROGRESS NOTES
Name: Sp Malagon ADMIT: 2020   : 1935  PCP: Hieu Blanco Jr., MD    MRN: [id number] LOS: 10 days   AGE/SEX: 84 y.o. male  ROOM: [facility]/     Subjective   Subjective   Patient unable to provide any history because of his end-stage dementia.  History from the nurse.  Continues with very poor p.o. intake.  No abdominal pain or nausea or vomiting.  Positive wetting of the diapers.  No headache or focal neurological symptoms.  Per nurse there is no choking with foods or liquids.    Review of Systems   Cardiovascular/respiratory.  No chest pain.  No shortness of breath.  No cough.  No ankle edema.  Objective   Objective   Vital Signs  Temp:  [97.3 °F (36.3 °C)-98.7 °F (37.1 °C)] 98 °F (36.7 °C)  Heart Rate:  [75-82] 77  Resp:  [16] 16  BP: (116-170)/(67-93) 134/74  SpO2:  [95 %-96 %] 95 %  on   ;   Device (Oxygen Therapy): room air    Intake/Output Summary (Last 24 hours) at 2020 1248  Last data filed at 2020 0924  Gross per 24 hour   Intake 420 ml   Output --   Net 420 ml     Body mass index is 24.32 kg/m².      20  1633 20  1300   Weight: 69.2 kg (152 lb 8.9 oz) 72.5 kg (159 lb 14.4 oz)     Physical Exam   Constitutional: No distress.   Undernourished.   HENT:   Head: Normocephalic and atraumatic.   Mouth/Throat: No oropharyngeal exudate.   Mildly dry mucous membrane.   Eyes: Conjunctivae are normal. No scleral icterus.   Neck: No JVD present.   Neck is stiff.   Cardiovascular: Normal rate, regular rhythm and normal heart sounds.   No murmur heard.  Pulmonary/Chest: Effort normal and breath sounds normal. No respiratory distress. He has no wheezes. He has no rales.   Abdominal: Soft. Bowel sounds are normal. He exhibits no distension and no mass. There is no tenderness. There is no rebound and no guarding.   Musculoskeletal: He exhibits no edema.   Multiple joint contractures.   Lymphadenopathy:     He has no cervical adenopathy.   Neurological: He is alert.   Oriented x0.  No  obvious focal neurological deficits.   Skin: He is not diaphoretic.   Nursing note and vitals reviewed.      Results Review:      Results from last 7 days   Lab Units 05/07/20  0730 05/06/20 0436 05/05/20 1956 05/05/20  0542 05/04/20  0443 05/03/20  0428 05/01/20  2350   SODIUM mmol/L 141 140  --  141 141 142  --    POTASSIUM mmol/L 3.7 3.6 3.9 3.1* 3.6 3.7 3.6   CHLORIDE mmol/L 107 107  --  106 106 109*  --    CO2 mmol/L 23.5 23.4  --  24.3 21.2* 22.3  --    BUN mg/dL 14 11  --  11 10 13  --    CREATININE mg/dL 0.76 0.81  --  0.68* 0.80 0.75*  --    GLUCOSE mg/dL 99 91  --  101* 99 117*  --    CALCIUM mg/dL 8.7 8.6  --  8.5* 8.3* 8.3*  --      Estimated Creatinine Clearance: 73 mL/min (by C-G formula based on SCr of 0.76 mg/dL).                      Results from last 7 days   Lab Units 05/02/20  0420   MAGNESIUM mg/dL 2.2           Invalid input(s): LDLCALC  Results from last 7 days   Lab Units 05/07/20 0730 05/06/20 0436 05/05/20  0542   WBC 10*3/mm3 8.03 7.07 8.21   HEMOGLOBIN g/dL 12.8* 12.4* 12.3*   HEMATOCRIT % 35.5* 36.0* 34.4*   PLATELETS 10*3/mm3 187 152 150   MCV fL 82.8 85.1 83.7   MCH pg 29.8 29.3 29.9   MCHC g/dL 36.1* 34.4 35.8*   RDW % 15.2 15.2 14.9   RDW-SD fl 44.2 45.7 44.5   MPV fL 10.4 10.6 11.4   NEUTROPHIL % %  --  62.5 64.5   LYMPHOCYTE % %  --  24.8 21.0   MONOCYTES % %  --  8.2 10.8   EOSINOPHIL % %  --  2.3 1.6   BASOPHIL % %  --  0.6 0.4   IMM GRAN % %  --  1.6* 1.7*   NEUTROS ABS 10*3/mm3 5.92 4.43 5.30   LYMPHS ABS 10*3/mm3  --  1.75 1.72   MONOS ABS 10*3/mm3  --  0.58 0.89   EOS ABS 10*3/mm3 0.08 0.16 0.13   BASOS ABS 10*3/mm3  --  0.04 0.03   IMMATURE GRANS (ABS) 10*3/mm3  --  0.11* 0.14*   NRBC /100 WBC  --  0.0 0.0                                           Imaging:  Imaging Results (Last 24 Hours)     ** No results found for the last 24 hours. **             I reviewed the patient's new clinical results / labs / tests / procedures      Assessment/Plan     Active Hospital Problems     Diagnosis  POA   • **Hypernatremia [E87.0]  Unknown   • Acute renal failure (ARF) (CMS/HCC) [N17.9]  Yes   • BPH (benign prostatic hyperplasia) [N40.0]  Yes   • Parkinson disease (CMS/HCC) [G20]  Yes   • Hypertension [I10]  Yes   • Degeneration of lumbar intervertebral disc [M51.36]  Yes      Resolved Hospital Problems   No resolved problems to display.           · Acute kidney insufficiency and hypenatremia secondary to volume depletion and dehydration.  Both back to normal with IV fluid.  Patient continues with poor p.o. intake.  There is likelihood of recurrence of this if the patient does not get artificial feeds.  Family refuses artificial feeds.  We will recheck basic metabolic profile phosphorus and magnesium.  · Hypokalemia.  Resolved with substitution recheck potassium and magnesium.  · End-stage Parkinson's disease.  Continue Sinemet.  · Discussed with nurse.  · Anticipate discharge tomorrow after checking blood tests.      Vanesa eVla MD  [facility]  05/08/20  12:48

## 2020-05-08 NOTE — PLAN OF CARE
"  Problem: Patient Care Overview  Goal: Plan of Care Review  Outcome: Ongoing (interventions implemented as appropriate)  Flowsheets  Taken 5/5/2020 1712 by Marcos Galeano, RN  Progress: no change  Taken 5/8/2020 0503 by Tam Blackman, RN  Plan of Care Reviewed With: patient  Note:   Pt resting quietly at this time, was restless at beginning of shift , trying to get out of bed, states \" I'm leaving\". Pt reoriented to room and location and reposition. Denies discomfort. Fluids offered with med pass, took 120cc, attempt to do oral care but pt was resistant. NAD, VSS, will ctm.       "

## 2020-05-08 NOTE — PLAN OF CARE
This morning patient talking less garbled where it was logical and understandable. Oriented to name only. Held his own nectar thick water cup and drank from it (also held and drank nectar thick orange juice) at breakfast. Ate only a very few bites of food for breakfast and lunch. Would not hold a cup at lunch. Restless at lunch time. Wound care done. Wife updated by phone. Telemetry SR.  Problem: Patient Care Overview  Goal: Plan of Care Review  Outcome: Ongoing (interventions implemented as appropriate)  Goal: Individualization and Mutuality  Outcome: Ongoing (interventions implemented as appropriate)  Goal: Discharge Needs Assessment  Outcome: Ongoing (interventions implemented as appropriate)  Goal: Interprofessional Rounds/Family Conf  Outcome: Ongoing (interventions implemented as appropriate)     Problem: Patient Care Overview  Goal: Plan of Care Review  Outcome: Ongoing (interventions implemented as appropriate)  Goal: Individualization and Mutuality  Outcome: Ongoing (interventions implemented as appropriate)  Goal: Discharge Needs Assessment  Outcome: Ongoing (interventions implemented as appropriate)  Goal: Interprofessional Rounds/Family Conf  Outcome: Ongoing (interventions implemented as appropriate)

## 2020-05-09 VITALS
OXYGEN SATURATION: 96 % | HEART RATE: 75 BPM | HEIGHT: 68 IN | RESPIRATION RATE: 16 BRPM | SYSTOLIC BLOOD PRESSURE: 150 MMHG | BODY MASS INDEX: 24.23 KG/M2 | DIASTOLIC BLOOD PRESSURE: 95 MMHG | WEIGHT: 159.9 LBS | TEMPERATURE: 97.9 F

## 2020-05-09 PROBLEM — N17.9 ACUTE RENAL FAILURE (ARF) (HCC): Status: RESOLVED | Noted: 2020-04-28 | Resolved: 2020-05-09

## 2020-05-09 PROBLEM — E87.0 HYPERNATREMIA: Status: RESOLVED | Noted: 2020-04-29 | Resolved: 2020-05-09

## 2020-05-09 LAB
ANION GAP SERPL CALCULATED.3IONS-SCNC: 11.7 MMOL/L (ref 5–15)
BASOPHILS # BLD AUTO: 0.03 10*3/MM3 (ref 0–0.2)
BASOPHILS NFR BLD AUTO: 0.4 % (ref 0–1.5)
BUN BLD-MCNC: 14 MG/DL (ref 8–23)
BUN/CREAT SERPL: 18.2 (ref 7–25)
CALCIUM SPEC-SCNC: 9.2 MG/DL (ref 8.6–10.5)
CHLORIDE SERPL-SCNC: 107 MMOL/L (ref 98–107)
CO2 SERPL-SCNC: 25.3 MMOL/L (ref 22–29)
CREAT BLD-MCNC: 0.77 MG/DL (ref 0.76–1.27)
DEPRECATED RDW RBC AUTO: 45.5 FL (ref 37–54)
EOSINOPHIL # BLD AUTO: 0.09 10*3/MM3 (ref 0–0.4)
EOSINOPHIL NFR BLD AUTO: 1.1 % (ref 0.3–6.2)
ERYTHROCYTE [DISTWIDTH] IN BLOOD BY AUTOMATED COUNT: 15.2 % (ref 12.3–15.4)
GFR SERPL CREATININE-BSD FRML MDRD: 97 ML/MIN/1.73
GLUCOSE BLD-MCNC: 95 MG/DL (ref 65–99)
HCT VFR BLD AUTO: 38.4 % (ref 37.5–51)
HGB BLD-MCNC: 13.4 G/DL (ref 13–17.7)
IMM GRANULOCYTES # BLD AUTO: 0.08 10*3/MM3 (ref 0–0.05)
IMM GRANULOCYTES NFR BLD AUTO: 1 % (ref 0–0.5)
LYMPHOCYTES # BLD AUTO: 1.89 10*3/MM3 (ref 0.7–3.1)
LYMPHOCYTES NFR BLD AUTO: 23.9 % (ref 19.6–45.3)
MAGNESIUM SERPL-MCNC: 2 MG/DL (ref 1.6–2.4)
MCH RBC QN AUTO: 29.5 PG (ref 26.6–33)
MCHC RBC AUTO-ENTMCNC: 34.9 G/DL (ref 31.5–35.7)
MCV RBC AUTO: 84.4 FL (ref 79–97)
MONOCYTES # BLD AUTO: 0.63 10*3/MM3 (ref 0.1–0.9)
MONOCYTES NFR BLD AUTO: 8 % (ref 5–12)
NEUTROPHILS # BLD AUTO: 5.18 10*3/MM3 (ref 1.7–7)
NEUTROPHILS NFR BLD AUTO: 65.6 % (ref 42.7–76)
NRBC BLD AUTO-RTO: 0 /100 WBC (ref 0–0.2)
PHOSPHATE SERPL-MCNC: 2.6 MG/DL (ref 2.5–4.5)
PLATELET # BLD AUTO: 227 10*3/MM3 (ref 140–450)
PMV BLD AUTO: 9.7 FL (ref 6–12)
POTASSIUM BLD-SCNC: 3.8 MMOL/L (ref 3.5–5.2)
RBC # BLD AUTO: 4.55 10*6/MM3 (ref 4.14–5.8)
SODIUM BLD-SCNC: 144 MMOL/L (ref 136–145)
WBC NRBC COR # BLD: 7.9 10*3/MM3 (ref 3.4–10.8)

## 2020-05-09 PROCEDURE — 80048 BASIC METABOLIC PNL TOTAL CA: CPT | Performed by: INTERNAL MEDICINE

## 2020-05-09 PROCEDURE — 84100 ASSAY OF PHOSPHORUS: CPT | Performed by: INTERNAL MEDICINE

## 2020-05-09 PROCEDURE — 85025 COMPLETE CBC W/AUTO DIFF WBC: CPT | Performed by: INTERNAL MEDICINE

## 2020-05-09 PROCEDURE — 83735 ASSAY OF MAGNESIUM: CPT | Performed by: INTERNAL MEDICINE

## 2020-05-09 RX ADMIN — CARBIDOPA AND LEVODOPA 2 TABLET: 25; 100 TABLET, EXTENDED RELEASE ORAL at 08:35

## 2020-05-09 RX ADMIN — FAMOTIDINE 20 MG: 20 TABLET, FILM COATED ORAL at 08:35

## 2020-05-09 RX ADMIN — CARBIDOPA AND LEVODOPA 2 TABLET: 25; 100 TABLET, EXTENDED RELEASE ORAL at 12:45

## 2020-05-09 RX ADMIN — SODIUM CHLORIDE, PRESERVATIVE FREE 10 ML: 5 INJECTION INTRAVENOUS at 08:36

## 2020-05-09 NOTE — DISCHARGE SUMMARY
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  Please refer to the H&P for full details.  An 84 years old white gentleman nursing home resident with a history of Parkinson's/dementia/hypertension/benign prostatic hypertrophy presented to the emergency department on account of lethargy and decreased responsiveness and diminished p.o. intake.  No nausea.  No vomiting.  No diarrhea.  No abdominal pain.  No fever or chills.  No shortness of breath.  No cough.  No chest pain.  No focal neurological symptoms.  Physical examination was remarkable for an afebrile patient a pulse of 61 respiratory rate of 18 blood pressure 136/98 and O2 sats of 96% on room air the rest of the examination is remarkable for dry mucous membrane.  Disorientation.  Hospital course.  Initial evaluation in the emergency department included chemistries revealing a blood sugar of 106 BUN 66 creatinine 1.9 chloride 129 AST of 48 ALT of 69 total bilirubin of 2 GFR of 33 and sodium of 170.  Troponin was negative.  CBC was normal except a white count of 11.6.  UA without UTI.  CT scan of the brain was essentially negative.  Chest x-ray with no acute disease.  The impression on this gentleman was that of metabolic encephalopathy secondary to hyperosmolar status and acute renal failure.  This was thought to be secondary to dehydration because of poor  p.o. intake.  He was started on D5W IV fluids nephrology was consulted his acute renal failure and hypernatremia has resolved by the time of discharge after hydration.  Continue to have very poor p.o. intake.  Family has declined any form of artificial feeds understanding that this could be a recurrent problem.  For his Parkinson we continued his Sinemet and for his dementia we will continue the Exelon.  His amlodipine was stopped secondary to borderline low blood pressure and the blood pressure need to be monitored as an outpatient and a decision about resumption should be taken.  We continued him on his Proscar for his benign prostatic hypertrophy.  He had a stage I decubitus of the coccyx and he was placed on decubitus precautions and local wound care.  At the time of discharge he was hemodynamically stable.    Consultants     Consult Orders (all) (From admission, onward)     Start     Ordered    04/28/20 2017  Inpatient Case Management  Consult  Once     Provider:  (Not yet assigned)    04/28/20 2017 04/28/20 1402  [facility] (on-call MD unless specified) Details  Once     Specialty:  Hospitalist  Provider:  (Not yet assigned)    04/28/20 1401    04/28/20 1402  Nephrology (on -call MD unless specified)  Once     Specialty:  Nephrology  Provider:  (Not yet assigned)    04/28/20 1401              Procedures     Imaging Results (All)     Procedure Component Value Units Date/Time    CT Head Without Contrast [[id number]] Collected:  04/28/20 2124     Updated:  04/28/20 2130    Narrative:       CT HEAD WO CONTRAST-     CLINICAL HISTORY: Acute onset of confusion. Rule out stroke.     TECHNIQUE: Transverse 3 mm thick images were acquired from the base of  skull to the vertex without IV contrast.     Radiation dose reduction techniques were utilized, including automated  exposure control and exposure modulation based on body size.     COMPARISON: CT scan of the head dated 03/27/2020.     FINDINGS: The brain and  ventricular system appear structurally within  normal limits for patient of this age. There is no evidence of recent or  old intracranial hemorrhage or infarction. No masses are identified. The  paranasal sinuses are well aerated       Impression:       Negative CT scan of the head without contrast.     This report was finalized on 4/28/2020 9:27 PM by Dr. Gregory Brooke M.D.       XR Chest 1 View [[id number]] Collected:  04/28/20 1315     Updated:  04/28/20 1320    Narrative:       XR CHEST 1 VW-     Clinical: Acute mental status change     FINDINGS: There is a shallow inspiratory effort. No vascular congestion,  pleural effusion or lung consolidation. Cardiac size within normal  limits. Mediastinum is stable.     CONCLUSION: No acute cardiovascular or pulmonary process is  demonstrated.     This report was finalized on 4/28/2020 1:16 PM by Dr. Rober Luo M.D.             Pertinent Labs     Results from last 7 days   Lab Units 05/09/20 0625 05/07/20 0730 05/06/20 0436 05/05/20  0542   WBC 10*3/mm3 7.90 8.03 7.07 8.21   HEMOGLOBIN g/dL 13.4 12.8* 12.4* 12.3*   PLATELETS 10*3/mm3 227 187 152 150     Results from last 7 days   Lab Units 05/09/20 0625 05/07/20 0730 05/06/20 0436 05/05/20 1956 05/05/20  0542   SODIUM mmol/L 144 141 140  --  141   POTASSIUM mmol/L 3.8 3.7 3.6 3.9 3.1*   CHLORIDE mmol/L 107 107 107  --  106   CO2 mmol/L 25.3 23.5 23.4  --  24.3   BUN mg/dL 14 14 11  --  11   CREATININE mg/dL 0.77 0.76 0.81  --  0.68*   GLUCOSE mg/dL 95 99 91  --  101*   Estimated Creatinine Clearance: 73 mL/min (by C-G formula based on SCr of 0.77 mg/dL).  Results from last 7 days   Lab Units 05/04/20 0443 05/03/20  0428   ALBUMIN g/dL 2.70* 2.70*     Results from last 7 days   Lab Units 05/09/20 0625 05/07/20 0730 05/06/20 0436 05/05/20  0542 05/04/20 0443 05/03/20  0428   CALCIUM mg/dL 9.2 8.7 8.6 8.5* 8.3* 8.3*   ALBUMIN g/dL  --   --   --   --  2.70* 2.70*   MAGNESIUM mg/dL 2.0  --   --   --   --   --     PHOSPHORUS mg/dL 2.6  --   --   --  2.6 2.1*               Invalid input(s): LDLCALC      Imaging Results (Last 24 Hours)     ** No results found for the last 24 hours. **          Test Results Pending at Discharge         Discharge Exam   Physical Exam  Vital Signs  Temp:  [97.6 °F (36.4 °C)-98.6 °F (37 °C)] 97.6 °F (36.4 °C)  Heart Rate:  [74-89] 77  Resp:  [16] 16  BP: (116-161)/(67-93) 161/93  SpO2:  [95 %-96 %] 96 %  on   ;   Device (Oxygen Therapy): room air     Intake/Output Summary (Last 24 hours) at 5/9/2020 0910  Last data filed at 5/8/2020 1744      Gross per 24 hour   Intake 360 ml   Output --   Net 360 ml      Body mass index is 24.32 kg/m².  Vitals              04/28/20  1633 04/29/20  1300   Weight: 69.2 kg (152 lb 8.9 oz) 72.5 kg (159 lb 14.4 oz)         Physical Exam  Constitutional: No distress.   Undernourished.   HENT:   Head: Normocephalic and atraumatic.   Mouth/Throat: No oropharyngeal exudate.   Mildly dry mucous membrane.   Eyes: Conjunctivae are normal. No scleral icterus.   Neck: No JVD present.   Neck is stiff.   Cardiovascular: Normal rate, regular rhythm and normal heart sounds.   No murmur heard.  Pulmonary/Chest: Effort normal and breath sounds normal. No respiratory distress. He has no wheezes. He has no rales.   Abdominal: Soft. Bowel sounds are normal. He exhibits no distension and no mass. There is no tenderness. There is no rebound and no guarding.   Musculoskeletal: He exhibits no edema.   Multiple joint contractures.   Lymphadenopathy:     He has no cervical adenopathy.   Neurological: He is alert.   Oriented x0.  No obvious focal neurological deficits.   Skin: He is not diaphoretic.   Nursing note and vitals reviewed    Discharge Details        Discharge Medications      Continue These Medications      Instructions Start Date   alfuzosin 10 MG 24 hr tablet  Commonly known as:  UROXATRAL   10 mg, Oral, Daily      aspirin 325 MG tablet   325 mg, Oral, Daily       carbidopa-levodopa ER  MG per tablet  Commonly known as:  SINEMET CR   2 tablets, Oral, 4 Times Daily      finasteride 5 MG tablet  Commonly known as:  PROSCAR   5 mg, Oral, Daily      omeprazole 20 MG capsule  Commonly known as:  priLOSEC   20 mg, Oral, 2 Times Daily      Remedy Nutrashield 1 % cream  Generic drug:  dimethicone   1 application, Topical, 2 Times Daily PRN      rivastigmine 1.5 MG capsule  Commonly known as:  EXELON   1.5 mg, Oral, Daily      vitamin B-12 500 MCG tablet  Commonly known as:  CYANOCOBALAMIN   500 mcg, Oral, Daily      vitamin B-6 50 MG tablet  Commonly known as:  PYRIDOXINE   100 mg, Oral, Daily         Stop These Medications    amLODIPine 10 MG tablet  Commonly known as:  NORVASC     cetirizine 5 MG tablet  Commonly known as:  zyrTEC            No Known Allergies      Discharge Disposition:  Condition: Stable    Diet:   Diet Order   Procedures   • Diet Dysphagia; II - Pureed; Nectar / Syrup Thick; Renal       Activity:   Activity Instructions     Activity as Tolerated      Other Activity Instructions      Activity Instructions: Physical therapy and Occupational Therapy to evaluate and treat    Up WIth Assist                CODE STATUS:    Code Status and Medical Interventions:   Ordered at: 04/28/20 1539     Code Status:    CPR     Medical Interventions (Level of Support Prior to Arrest):    Full       No future appointments.  Additional Instructions for the Follow-ups that You Need to Schedule     Call MD With Problems / Concerns   As directed      Instructions: Call MD or return to ER if chest pain/shortness of breath/fever chills/fatigue and weakness    Order Comments:  Instructions: Call MD or return to ER if chest pain/shortness of breath/fever chills/fatigue and weakness          Discharge Follow-up with PCP   As directed       Currently Documented PCP:    Hieu Blanco Jr., MD    PCP Phone Number:    [phone]     Follow Up Details:  At nursing home for  dehydration/acute renal failure/hyponatremia.            Contact information for follow-up providers     Hieu Blanco Jr., MD .    Specialty:  Family Medicine  Why:  At nursing home for dehydration/acute renal failure/hyponatremia.  Contact information:  [address]  [place] [id number]  [phone]                   Contact information for after-discharge care     Destination     [facility] .    Service:  Skilled Nursing  Contact information:  [address]  [facility] [address] [phone]                               Time Spent on Discharge:  Greater than 30 minutes      Vanesa Vela MD  [facility]  05/09/20  9:19 AM

## 2020-05-09 NOTE — PROGRESS NOTES
Continued Stay Note  [facility]     Patient Name: Sp Malagon  MRN: [id number]  Today's Date: 5/9/2020    Admit Date: 4/28/2020    Discharge Plan     Row Name 05/09/20 0945       Plan    Plan  [facility]- skilled via ambulance with [facility]    Plan Comments  Per muna Buiison at [facility] and she states skilled bed is available today for patient and can accept today.  Informed JEANETTE Cerda that [facility] can accept patient today and completed ambulance form and faxed to JEANETTE Cerda for [facility].... Ana Fu RN,CCP         Discharge Codes    No documentation.       Expected Discharge Date and Time     Expected Discharge Date Expected Discharge Time    May 9, 2020             Ana Fu RN

## 2020-05-09 NOTE — PROGRESS NOTES
"Physicians Statement of Medical Necessity for  Ambulance Transportation    GENERAL INFORMATION     Name: Sp aMlagon  YOB: 1935  Medicare #: [id number]  Transport Date: 5/9/2020 (Valid for round trips this date, or for scheduled repetitive trips for 60 days from the date signed below  Origin: [facility]  Destination: [facility]   Is the Patient's stay covered under Medicare Part A (PPS/DRG?)Yes   Closest appropriate facility? Yes  If this a hosp-hosp transfer? No  Is this a hospice patient? No    MEDICAL NECESSITY QUESTIONAIRE    Ambulance Transportation is medically necessary only if other means of transportation are contraindicated or would be potentially harmful to the patient.  To meet this requirement, the patient must be either \"bed confined\" or suffer from a condition such that transport by means other than an ambulance is contraindicated by the patient's condition.  The following questions must be answered by the healthcare professional signing below for this form to be valid:     1) Describe the MEDICAL CONDITION (physical and/or mental) of this patient AT THE TIME OF AMBULANCE TRANSPORT that requires the patient to be transported in an ambulance, and why transport by other means is contraindicated by the patient's condition:  Patient is confused and bed bound  Past Medical History:   Diagnosis Date   • Abnormal EKG     PT UNSURE    • Allergic rhinitis    • Alzheimer disease (CMS/HCC)    • Anarthria    • Arthritis    • Ataxia    • Back pain    • BPH (benign prostatic hyperplasia)    • Dysarthria    • GERD (gastroesophageal reflux disease)    • Hypertension    • Left anterior fascicular block    • Lumbar spinal stenosis    • Numbness and tingling     LEFT LEG AND LEFT FOOT   • Parkinson disease (CMS/HCC)     [facility]   • Sacral back pain    • Tremor       Past Surgical History:   Procedure Laterality Date   • BACK SURGERY     • CATARACT " "EXTRACTION Bilateral 2016   • CERVICAL SPINE ANTERIOR     • EYE SURGERY Left     cataract   • HIP ARTHROPLASTY Right    • LAMINECTOMY  2004 2005   • LUMBAR DISCECTOMY FUSION INSTRUMENTATION N/A 10/3/2016    Procedure: L3-L5 HARDWARE REMOVAL. L5-S1 EXPIRATION OF FUSION;  Surgeon: Tristen Baez MD;  Location: [facility];  Service:    • LUMBAR FUSION  2009   • LUMBAR LAMINECTOMY DISCECTOMY DECOMPRESSION N/A 10/3/2016    Procedure: L5-S1 LUMBAR LAMINECTOMY DISCECTOMY DECOMPRESSION POSTERIOR ;  Surgeon: Rajesh Kenney MD;  Location: [facility];  Service:       2) Is this patient \"bed confined\" as defined below?Yes    To be \"bed confined\" the patient must satisfy all three of the following criteria:  (1) unable to get up from bed without assistance; AND (2) unable to ambulate;  AND (3) unable to sit in a chair or wheelchair.  3) Can this patient safely be transported by car or wheelchair van (I.e., may safely sit during transport, without an attendant or monitoring?)No   4. In addition to completing questions 1-3 above, please check any of the following conditions that apply*:          *Note: supporting documentation for any boxes checked must be maintained in the patient's medical records Patient is confused      SIGNATURE OF PHYSICIAN OR OTHER AUTHORIZED HEALTHCARE PROFESSIONAL    I certify that the above information is true and correct based on my evaluation of this patient, and represent that the patient requires transport by ambulance and that other forms of transport are contraindicated.  I understand that this information will be used by the Centers for Medicare and Medicaid Services (CMS) to support the determiniation of medical necessity for ambulance services, and I represent that I have personal knowledge of the patient's condition at the time of transport.       If this box is checked, I also certify that the patient is physically or mentally incapable of signing the ambulance service's claim " form and that the institution with which I am affiliated has furnished care, services or assistance to the patient.  My signature below is made on behalf of the patient pursuant to 42 .36(b)(4). In accordance with 42 .37, the specific reason(s) that the patient is physically or mentally incapable of signing the claim for is as follows:     Signature of Physician or Healthcare Professional  Date/Time:   5/9/2020  9:41am     (For Scheduled repetitive transport, this form is not valid for transports performed more than 60 days after this date).             Ana Fu RN,[facility]                                                                                                                               --------------------------------------------------------------------------------------------  Printed Name and Credentials of Physician or Authorized Healthcare Professional     *Form must be signed by patient's attending physician for scheduled, repetitive transports,.  For non-repetitive ambulance transports, if unable to obtain the signature of the attending physician, any of the following may sign (please select below):     Physician  Clinical Nurse Specialist  Registered Nurse     Physician Assistant  Discharge Planner  Licensed Practical Nurse     Nurse Practitioner    X

## 2020-05-09 NOTE — PROGRESS NOTES
Name: Sp Malagon ADMIT: 2020   : 1935  PCP: Hieu Blanco Jr., MD    MRN: [id number] LOS: 11 days   AGE/SEX: 84 y.o. male  ROOM: [facility]     Subjective   Subjective   Most of the history is from the nurse as the patient is with dementia.  No fever or chills no chest pain no cough no episodes of aspiration no nausea or vomiting and no abdominal pain.  It appears that the patient is more alert and cooperative.    Review of Systems   positive incontinence no hematuria  CNS.  No seizures improved her level of consciousness.     Objective   Objective   Vital Signs  Temp:  [97.6 °F (36.4 °C)-98.6 °F (37 °C)] 97.6 °F (36.4 °C)  Heart Rate:  [74-89] 77  Resp:  [16] 16  BP: (116-161)/(67-93) 161/93  SpO2:  [95 %-96 %] 96 %  on   ;   Device (Oxygen Therapy): room air    Intake/Output Summary (Last 24 hours) at 2020 0910  Last data filed at 2020 1744  Gross per 24 hour   Intake 360 ml   Output --   Net 360 ml     Body mass index is 24.32 kg/m².      20  1633 20  1300   Weight: 69.2 kg (152 lb 8.9 oz) 72.5 kg (159 lb 14.4 oz)     Physical Exam  Constitutional: No distress.   Undernourished.   HENT:   Head: Normocephalic and atraumatic.   Mouth/Throat: No oropharyngeal exudate.   Mildly dry mucous membrane.   Eyes: Conjunctivae are normal. No scleral icterus.   Neck: No JVD present.   Neck is stiff.   Cardiovascular: Normal rate, regular rhythm and normal heart sounds.   No murmur heard.  Pulmonary/Chest: Effort normal and breath sounds normal. No respiratory distress. He has no wheezes. He has no rales.   Abdominal: Soft. Bowel sounds are normal. He exhibits no distension and no mass. There is no tenderness. There is no rebound and no guarding.   Musculoskeletal: He exhibits no edema.   Multiple joint contractures.   Lymphadenopathy:     He has no cervical adenopathy.   Neurological: He is alert.   Oriented x0.  No obvious focal neurological deficits.   Skin: He is not diaphoretic.    Nursing note and vitals reviewed.  Results Review:      Results from last 7 days   Lab Units 05/09/20  0625 05/07/20  0730 05/06/20  0436 05/05/20 1956 05/05/20  0542 05/04/20 0443 05/03/20  0428   SODIUM mmol/L 144 141 140  --  141 141 142   POTASSIUM mmol/L 3.8 3.7 3.6 3.9 3.1* 3.6 3.7   CHLORIDE mmol/L 107 107 107  --  106 106 109*   CO2 mmol/L 25.3 23.5 23.4  --  24.3 21.2* 22.3   BUN mg/dL 14 14 11  --  11 10 13   CREATININE mg/dL 0.77 0.76 0.81  --  0.68* 0.80 0.75*   GLUCOSE mg/dL 95 99 91  --  101* 99 117*   CALCIUM mg/dL 9.2 8.7 8.6  --  8.5* 8.3* 8.3*     Estimated Creatinine Clearance: 73 mL/min (by C-G formula based on SCr of 0.77 mg/dL).                      Results from last 7 days   Lab Units 05/09/20 0625   MAGNESIUM mg/dL 2.0           Invalid input(s): LDLCALC  Results from last 7 days   Lab Units 05/09/20 0625 05/07/20 0730 05/06/20 0436 05/05/20  0542   WBC 10*3/mm3 7.90 8.03 7.07 8.21   HEMOGLOBIN g/dL 13.4 12.8* 12.4* 12.3*   HEMATOCRIT % 38.4 35.5* 36.0* 34.4*   PLATELETS 10*3/mm3 227 187 152 150   MCV fL 84.4 82.8 85.1 83.7   MCH pg 29.5 29.8 29.3 29.9   MCHC g/dL 34.9 36.1* 34.4 35.8*   RDW % 15.2 15.2 15.2 14.9   RDW-SD fl 45.5 44.2 45.7 44.5   MPV fL 9.7 10.4 10.6 11.4   NEUTROPHIL % % 65.6  --  62.5 64.5   LYMPHOCYTE % % 23.9  --  24.8 21.0   MONOCYTES % % 8.0  --  8.2 10.8   EOSINOPHIL % % 1.1  --  2.3 1.6   BASOPHIL % % 0.4  --  0.6 0.4   IMM GRAN % % 1.0*  --  1.6* 1.7*   NEUTROS ABS 10*3/mm3 5.18 5.92 4.43 5.30   LYMPHS ABS 10*3/mm3 1.89  --  1.75 1.72   MONOS ABS 10*3/mm3 0.63  --  0.58 0.89   EOS ABS 10*3/mm3 0.09 0.08 0.16 0.13   BASOS ABS 10*3/mm3 0.03  --  0.04 0.03   IMMATURE GRANS (ABS) 10*3/mm3 0.08*  --  0.11* 0.14*   NRBC /100 WBC 0.0  --  0.0 0.0                                           Imaging:  Imaging Results (Last 24 Hours)     ** No results found for the last 24 hours. **             I reviewed the patient's new clinical results / labs / tests /  procedures      Assessment/Plan     Active Hospital Problems    Diagnosis  POA   • **Hypernatremia [E87.0]  Unknown   • Acute renal failure (ARF) (CMS/HCC) [N17.9]  Yes   • BPH (benign prostatic hyperplasia) [N40.0]  Yes   • Parkinson disease (CMS/HCC) [G20]  Yes   • Hypertension [I10]  Yes   • Degeneration of lumbar intervertebral disc [M51.36]  Yes      Resolved Hospital Problems   No resolved problems to display.       · Acute kidney insufficiency and hypenatremia secondary to volume depletion and dehydration.  Both back to normal with IV fluid.  Patient continues with poor p.o. intake.  There is likelihood of recurrence of this if the patient does not get artificial feeds.  Family refuses artificial feeds.    Normal renal function/phosphorus/magnesium today.    · Hypokalemia.  Resolved with substitution recheck potassium and magnesium.  · End-stage Parkinson's disease.  Continue Sinemet.  · Discussed with nurse.    · Discharge to nursing home today.      Vanesa Vela MD  [facility]  05/09/20  09:10

## 2020-05-09 NOTE — PLAN OF CARE
Patient is more alert and attempts to hold coherent conversation at times. He spoke with his wife via telephone and did hold the reciever and speak with her. Patient is also reaching for and attempting to hold his own cup to drink thick fluids. Lungs are clear bilaterally and diminished in the bases. Abdomen is soft with +BS in all quadrants and no c/o tenderness upon palpation. Nursing is turning and repositioning him every two hours to help with healing of his coccyx area and right heel pressure area. His bilateral heels are floating/off-loading as well.

## 2020-05-11 NOTE — PROGRESS NOTES
Case Management Discharge Note      Final Note: Skilled bed at [facility] via [facility]. Dominga Mika CSW     Provided Post Acute Provider List?: N/A  N/A Provider List Comment: Patient's spouse declined and stated she would like the patient to return to [facility]  Provided Post Acute Provider Quality & Resource List?: N/A  N/A Quality & Resource List Comment: Patient's spouse declined and stated she would like for the patient to return to [facility]    Destination - Selection Complete      Service Provider Request Status Selected Services Address Phone Number Fax Number    [facility] Selected Skilled Nursing [facility] [address] [phone]      Durable Medical Equipment      No service has been selected for the patient.      Dialysis/Infusion      No service has been selected for the patient.      Home Medical Care      No service has been selected for the patient.      Therapy      No service has been selected for the patient.      Community Resources      No service has been selected for the patient.        Transportation Services  Ambulance: [facility]    Final Discharge Disposition Code: 03 - skilled nursing facility (SNF)

## 2025-01-06 NOTE — PROGRESS NOTES
"   LOS: 5 days    Patient Care Team:  Hieu Blanco Jr., MD as PCP - General (Family Medicine)    Chief Complaint:    Chief Complaint   Patient presents with   • Abnormal Lab   • Weakness - Generalized   • Altered Mental Status     Follow up JAX and hypernatremia  Subjective     Interval History:   More awake than yesterday; speech much more intelligible; fairly communicative; D5W infusing      Objective     Vital Signs  Temp:  [97.6 °F (36.4 °C)-98 °F (36.7 °C)] 98 °F (36.7 °C)  Heart Rate:  [53-55] 55  Resp:  [18] 18  BP: (146-167)/(87-98) 146/89    Flowsheet Rows      First Filed Value   Admission Height  172.7 cm (67.99\") Documented at 04/28/2020 1633   Admission Weight  69.2 kg (152 lb 8.9 oz) Documented at 04/28/2020 1633          No intake/output data recorded.  I/O last 3 completed shifts:  In: 1000 [I.V.:1000]  Out: -     Intake/Output Summary (Last 24 hours) at 5/3/2020 1442  Last data filed at 5/3/2020 0700  Gross per 24 hour   Intake 1000 ml   Output --   Net 1000 ml       Physical Exam:  Elderly, awake.  Communicative.  Looks much more comfortable than yesterday  Oral mucosa dry but less so than yesterday; tongue coated with dried secretions  Neck no jvd  Heart RR no s3 or rub. Bradycardic.   Lungs clear.  No wheezes. No rales.  Not labored on room air  Abd +bs, soft, nontender, ND  No body wall edema  Ext no edema  Skin dry.  No rash        Results Review:    Results from last 7 days   Lab Units 05/03/20  0428 05/01/20  2350 05/01/20  0426 04/30/20  0429  04/29/20  0602  04/28/20  1322   SODIUM mmol/L 142  --  148* 154*   < > 164*   < > 170*   POTASSIUM mmol/L 3.7 3.6 2.9* 3.7   < > 3.4*   < > 3.8   CHLORIDE mmol/L 109*  --  113* 119*   < > 131*   < > 129*   CO2 mmol/L 22.3  --  24.4 24.6   < > 23.0   < > 26.8   BUN mg/dL 13  --  27* 43*   < > 59*   < > 66*   CREATININE mg/dL 0.75*  --  0.86 1.24   < > 1.70*   < > 1.94*   CALCIUM mg/dL 8.3*  --  7.8* 8.5*   < > 8.8   < > 9.9   BILIRUBIN mg/dL  --   --   " Received from cath lab.  Monitor and alarms on. Call Light in reach.   --   --   --  2.0*  --  2.0*   ALK PHOS U/L  --   --   --   --   --  101  --  124*   ALT (SGPT) U/L  --   --   --   --   --  62*  --  69*   AST (SGOT) U/L  --   --   --   --   --  34  --  48*   GLUCOSE mg/dL 117*  --  129* 147*   < > 173*   < > 106*    < > = values in this interval not displayed.       Estimated Creatinine Clearance: 73 mL/min (A) (by C-G formula based on SCr of 0.75 mg/dL (L)).    Results from last 7 days   Lab Units 05/03/20  0428 05/02/20  0420 05/01/20  0426 04/30/20  0429   MAGNESIUM mg/dL  --  2.2 2.2 2.7*   PHOSPHORUS mg/dL 2.1*  --  2.7 3.4             Results from last 7 days   Lab Units 04/29/20  0602 04/28/20  1322   WBC 10*3/mm3 11.21* 11.60*   HEMOGLOBIN g/dL 14.4 16.8   PLATELETS 10*3/mm3 158 180               Imaging Results (Last 24 Hours)     ** No results found for the last 24 hours. **          carbidopa-levodopa ER 2 tablet Oral 4x Daily   famotidine 20 mg Intravenous Daily   sodium chloride 10 mL Intravenous Q12H       dextrose 50 mL/hr Last Rate: 50 mL/hr (05/03/20 1437)       Medication Review:   Current Facility-Administered Medications   Medication Dose Route Frequency Provider Last Rate Last Dose   • acetaminophen (TYLENOL) 160 MG/5ML solution 650 mg  650 mg Oral Q4H PRN Marvin Hsu MD       • aluminum-magnesium hydroxide-simethicone (MAALOX MAX) 400-400-40 MG/5ML suspension 15 mL  15 mL Oral Q6H PRN Marvin Hsu MD       • carbidopa-levodopa ER (SINEMET CR)  MG per tablet 2 tablet  2 tablet Oral 4x Daily Marvin Hsu MD   2 tablet at 05/03/20 0940   • dextrose (D5W) 5 % infusion  50 mL/hr Intravenous Continuous Johnson Bellamy MD 50 mL/hr at 05/03/20 1437 50 mL/hr at 05/03/20 1437   • famotidine (PEPCID) injection 20 mg  20 mg Intravenous Daily Bryson Napoles MD   20 mg at 05/03/20 0906   • nitroglycerin (NITROSTAT) SL tablet 0.4 mg  0.4 mg Sublingual Q5 Min PRN Marvin Hsu MD       • ondansetron (ZOFRAN) injection  4 mg  4 mg Intravenous Q6H PRN Marvin Hsu MD       • potassium chloride 10 mEq in 100 mL IVPB  10 mEq Intravenous Q1H PRN Bryson Napoles  mL/hr at 05/01/20 1848 10 mEq at 05/01/20 1848   • sodium chloride 0.9 % flush 10 mL  10 mL Intravenous Q12H Marvin Hsu MD   10 mL at 05/03/20 0906   • sodium chloride 0.9 % flush 10 mL  10 mL Intravenous PRN Marvin Hsu MD   10 mL at 04/30/20 1613       Assessment/Plan   1. JAX and hypernatremia due to severe volume and water depletion.  Waste products improving. Na slowly improving.  Low potassium and low phosphorus  2. Parkinson's disease  3. Dysphagia. Not safe for PO intake.    4. Asymptomatic bradycardia.  BP stable.   Meds reviewed .    Plan:  1.  D5W, but will lower rate to just 50 mL/h  2.  Potassium phosphate IV  3.  Await speech stephan Bellamy MD  05/03/20  14:42

## 2025-06-12 NOTE — NURSING NOTE
A pre-sedation assessment was completed by the physician immediately prior to sedation start.  Dr. Chelita Finn was called and patient may go.  IV was D/Qasim, Site satisfactory with gauze and CoBan dressing applied.